# Patient Record
Sex: MALE | Race: WHITE | NOT HISPANIC OR LATINO | Employment: OTHER | URBAN - METROPOLITAN AREA
[De-identification: names, ages, dates, MRNs, and addresses within clinical notes are randomized per-mention and may not be internally consistent; named-entity substitution may affect disease eponyms.]

---

## 2020-07-19 ENCOUNTER — APPOINTMENT (OUTPATIENT)
Dept: RADIOLOGY | Facility: MEDICAL CENTER | Age: 45
DRG: 100 | End: 2020-07-19
Attending: EMERGENCY MEDICINE

## 2020-07-19 ENCOUNTER — APPOINTMENT (OUTPATIENT)
Dept: RADIOLOGY | Facility: MEDICAL CENTER | Age: 45
DRG: 100 | End: 2020-07-19
Attending: PSYCHIATRY & NEUROLOGY

## 2020-07-19 ENCOUNTER — HOSPITAL ENCOUNTER (INPATIENT)
Facility: MEDICAL CENTER | Age: 45
LOS: 3 days | DRG: 100 | End: 2020-07-22
Attending: EMERGENCY MEDICINE | Admitting: INTERNAL MEDICINE

## 2020-07-19 DIAGNOSIS — I16.1 HYPERTENSIVE EMERGENCY: ICD-10-CM

## 2020-07-19 DIAGNOSIS — E87.20 LACTIC ACIDOSIS: ICD-10-CM

## 2020-07-19 DIAGNOSIS — R56.9 SEIZURE-LIKE ACTIVITY (HCC): Primary | ICD-10-CM

## 2020-07-19 DIAGNOSIS — R73.9 HYPERGLYCEMIA: ICD-10-CM

## 2020-07-19 DIAGNOSIS — G40.901 STATUS EPILEPTICUS (HCC): ICD-10-CM

## 2020-07-19 PROBLEM — J96.01 ACUTE RESPIRATORY FAILURE WITH HYPOXIA (HCC): Status: ACTIVE | Noted: 2020-07-19

## 2020-07-19 PROBLEM — E66.09 CLASS 1 OBESITY DUE TO EXCESS CALORIES WITHOUT SERIOUS COMORBIDITY WITH BODY MASS INDEX (BMI) OF 31.0 TO 31.9 IN ADULT: Status: ACTIVE | Noted: 2020-07-19

## 2020-07-19 PROBLEM — F41.9 CHRONIC ANXIETY: Status: ACTIVE | Noted: 2020-07-19

## 2020-07-19 PROBLEM — N17.9 AKI (ACUTE KIDNEY INJURY) (HCC): Status: ACTIVE | Noted: 2020-07-19

## 2020-07-19 PROBLEM — E11.65 UNCONTROLLED TYPE 2 DIABETES MELLITUS WITH HYPERGLYCEMIA (HCC): Status: ACTIVE | Noted: 2020-07-19

## 2020-07-19 PROBLEM — R94.31 PROLONGED Q-T INTERVAL ON ECG: Status: ACTIVE | Noted: 2020-07-19

## 2020-07-19 PROBLEM — D72.829 LEUKOCYTOSIS: Status: ACTIVE | Noted: 2020-07-19

## 2020-07-19 LAB
ACTION RANGE TRIGGERED IACRT: YES
ALBUMIN SERPL BCP-MCNC: 2.9 G/DL (ref 3.2–4.9)
ALBUMIN SERPL BCP-MCNC: 3.7 G/DL (ref 3.2–4.9)
ALBUMIN/GLOB SERPL: 1.1 G/DL
ALBUMIN/GLOB SERPL: 1.1 G/DL
ALP SERPL-CCNC: 65 U/L (ref 30–99)
ALP SERPL-CCNC: 90 U/L (ref 30–99)
ALT SERPL-CCNC: 17 U/L (ref 2–50)
ALT SERPL-CCNC: 24 U/L (ref 2–50)
ANION GAP SERPL CALC-SCNC: 15 MMOL/L (ref 7–16)
ANION GAP SERPL CALC-SCNC: 17 MMOL/L (ref 7–16)
ANION GAP SERPL CALC-SCNC: 21 MMOL/L (ref 7–16)
ANION GAP SERPL CALC-SCNC: 22 MMOL/L (ref 7–16)
ANION GAP SERPL CALC-SCNC: 37 MMOL/L (ref 7–16)
ANISOCYTOSIS BLD QL SMEAR: ABNORMAL
APPEARANCE UR: ABNORMAL
APTT PPP: 22.6 SEC (ref 24.7–36)
AST SERPL-CCNC: 12 U/L (ref 12–45)
AST SERPL-CCNC: 14 U/L (ref 12–45)
B-OH-BUTYR SERPL-MCNC: 3.57 MMOL/L (ref 0.02–0.27)
BACTERIA #/AREA URNS HPF: NEGATIVE /HPF
BASE EXCESS BLDA CALC-SCNC: -9 MMOL/L (ref -4–3)
BASO STIPL BLD QL SMEAR: NORMAL
BASOPHILS # BLD AUTO: 0 % (ref 0–1.8)
BASOPHILS # BLD AUTO: 0.1 % (ref 0–1.8)
BASOPHILS # BLD: 0 K/UL (ref 0–0.12)
BASOPHILS # BLD: 0.02 K/UL (ref 0–0.12)
BILIRUB SERPL-MCNC: 0.7 MG/DL (ref 0.1–1.5)
BILIRUB SERPL-MCNC: 0.8 MG/DL (ref 0.1–1.5)
BILIRUB UR QL STRIP.AUTO: NEGATIVE
BODY TEMPERATURE: ABNORMAL DEGREES
BUN SERPL-MCNC: 16 MG/DL (ref 8–22)
BUN SERPL-MCNC: 18 MG/DL (ref 8–22)
BUN SERPL-MCNC: 19 MG/DL (ref 8–22)
BUN SERPL-MCNC: 20 MG/DL (ref 8–22)
BUN SERPL-MCNC: 21 MG/DL (ref 8–22)
CALCIUM SERPL-MCNC: 7.7 MG/DL (ref 8.5–10.5)
CALCIUM SERPL-MCNC: 7.8 MG/DL (ref 8.5–10.5)
CALCIUM SERPL-MCNC: 8.4 MG/DL (ref 8.5–10.5)
CALCIUM SERPL-MCNC: 8.4 MG/DL (ref 8.5–10.5)
CALCIUM SERPL-MCNC: 9.3 MG/DL (ref 8.5–10.5)
CHLORIDE SERPL-SCNC: 102 MMOL/L (ref 96–112)
CHLORIDE SERPL-SCNC: 104 MMOL/L (ref 96–112)
CHLORIDE SERPL-SCNC: 87 MMOL/L (ref 96–112)
CHLORIDE SERPL-SCNC: 92 MMOL/L (ref 96–112)
CHLORIDE SERPL-SCNC: 95 MMOL/L (ref 96–112)
CK SERPL-CCNC: 125 U/L (ref 0–154)
CO2 BLDA-SCNC: 20 MMOL/L (ref 20–33)
CO2 SERPL-SCNC: 16 MMOL/L (ref 20–33)
CO2 SERPL-SCNC: 16 MMOL/L (ref 20–33)
CO2 SERPL-SCNC: 19 MMOL/L (ref 20–33)
CO2 SERPL-SCNC: 19 MMOL/L (ref 20–33)
CO2 SERPL-SCNC: 9 MMOL/L (ref 20–33)
COLOR UR: YELLOW
COVID ORDER STATUS COVID19: NORMAL
CREAT SERPL-MCNC: 1.76 MG/DL (ref 0.5–1.4)
CREAT SERPL-MCNC: 1.77 MG/DL (ref 0.5–1.4)
CREAT SERPL-MCNC: 1.91 MG/DL (ref 0.5–1.4)
CREAT SERPL-MCNC: 2.1 MG/DL (ref 0.5–1.4)
CREAT SERPL-MCNC: 2.39 MG/DL (ref 0.5–1.4)
EKG IMPRESSION: NORMAL
EOSINOPHIL # BLD AUTO: 0 K/UL (ref 0–0.51)
EOSINOPHIL # BLD AUTO: 0.32 K/UL (ref 0–0.51)
EOSINOPHIL NFR BLD: 0 % (ref 0–6.9)
EOSINOPHIL NFR BLD: 1.7 % (ref 0–6.9)
EPI CELLS #/AREA URNS HPF: ABNORMAL /HPF
ERYTHROCYTE [DISTWIDTH] IN BLOOD BY AUTOMATED COUNT: 43.8 FL (ref 35.9–50)
ERYTHROCYTE [DISTWIDTH] IN BLOOD BY AUTOMATED COUNT: 44.7 FL (ref 35.9–50)
EST. AVERAGE GLUCOSE BLD GHB EST-MCNC: 372 MG/DL
GLOBULIN SER CALC-MCNC: 2.6 G/DL (ref 1.9–3.5)
GLOBULIN SER CALC-MCNC: 3.4 G/DL (ref 1.9–3.5)
GLUCOSE BLD-MCNC: 111 MG/DL (ref 65–99)
GLUCOSE BLD-MCNC: 132 MG/DL (ref 65–99)
GLUCOSE BLD-MCNC: 140 MG/DL (ref 65–99)
GLUCOSE BLD-MCNC: 169 MG/DL (ref 65–99)
GLUCOSE BLD-MCNC: 177 MG/DL (ref 65–99)
GLUCOSE BLD-MCNC: 181 MG/DL (ref 65–99)
GLUCOSE BLD-MCNC: 191 MG/DL (ref 65–99)
GLUCOSE BLD-MCNC: 204 MG/DL (ref 65–99)
GLUCOSE BLD-MCNC: 270 MG/DL (ref 65–99)
GLUCOSE BLD-MCNC: 393 MG/DL (ref 65–99)
GLUCOSE BLD-MCNC: 482 MG/DL (ref 65–99)
GLUCOSE BLD-MCNC: 483 MG/DL (ref 65–99)
GLUCOSE BLD-MCNC: 90 MG/DL (ref 65–99)
GLUCOSE BLD-MCNC: 93 MG/DL (ref 65–99)
GLUCOSE SERPL-MCNC: 115 MG/DL (ref 65–99)
GLUCOSE SERPL-MCNC: 136 MG/DL (ref 65–99)
GLUCOSE SERPL-MCNC: 484 MG/DL (ref 65–99)
GLUCOSE SERPL-MCNC: 538 MG/DL (ref 65–99)
GLUCOSE SERPL-MCNC: 558 MG/DL (ref 65–99)
GLUCOSE UR STRIP.AUTO-MCNC: >=1000 MG/DL
HBA1C MFR BLD: 14.6 % (ref 0–5.6)
HCO3 BLDA-SCNC: 19 MMOL/L (ref 17–25)
HCT VFR BLD AUTO: 37.5 % (ref 42–52)
HCT VFR BLD AUTO: 48.5 % (ref 42–52)
HGB BLD-MCNC: 12.3 G/DL (ref 14–18)
HGB BLD-MCNC: 15.5 G/DL (ref 14–18)
HOROWITZ INDEX BLDA+IHG-RTO: 397 MM[HG]
HYALINE CASTS #/AREA URNS LPF: ABNORMAL /LPF
IMM GRANULOCYTES # BLD AUTO: 0.11 K/UL (ref 0–0.11)
IMM GRANULOCYTES NFR BLD AUTO: 0.7 % (ref 0–0.9)
INST. QUALIFIED PATIENT IIQPT: YES
KETONES UR STRIP.AUTO-MCNC: 40 MG/DL
LACTATE BLD-SCNC: 16.6 MMOL/L (ref 0.5–2)
LEUKOCYTE ESTERASE UR QL STRIP.AUTO: NEGATIVE
LYMPHOCYTES # BLD AUTO: 1.55 K/UL (ref 1–4.8)
LYMPHOCYTES # BLD AUTO: 3.76 K/UL (ref 1–4.8)
LYMPHOCYTES NFR BLD: 10.4 % (ref 22–41)
LYMPHOCYTES NFR BLD: 20 % (ref 22–41)
MAGNESIUM SERPL-MCNC: 2.1 MG/DL (ref 1.5–2.5)
MAGNESIUM SERPL-MCNC: 2.2 MG/DL (ref 1.5–2.5)
MAGNESIUM SERPL-MCNC: 2.3 MG/DL (ref 1.5–2.5)
MANUAL DIFF BLD: NORMAL
MCH RBC QN AUTO: 29.7 PG (ref 27–33)
MCH RBC QN AUTO: 29.7 PG (ref 27–33)
MCHC RBC AUTO-ENTMCNC: 32 G/DL (ref 33.7–35.3)
MCHC RBC AUTO-ENTMCNC: 32.8 G/DL (ref 33.7–35.3)
MCV RBC AUTO: 90.6 FL (ref 81.4–97.8)
MCV RBC AUTO: 92.9 FL (ref 81.4–97.8)
METAMYELOCYTES NFR BLD MANUAL: 0.9 %
MICRO URNS: ABNORMAL
MICROCYTES BLD QL SMEAR: ABNORMAL
MONOCYTES # BLD AUTO: 0.98 K/UL (ref 0–0.85)
MONOCYTES # BLD AUTO: 1.03 K/UL (ref 0–0.85)
MONOCYTES NFR BLD AUTO: 5.2 % (ref 0–13.4)
MONOCYTES NFR BLD AUTO: 6.9 % (ref 0–13.4)
MORPHOLOGY BLD-IMP: NORMAL
NEUTROPHILS # BLD AUTO: 12.25 K/UL (ref 1.82–7.42)
NEUTROPHILS # BLD AUTO: 13.57 K/UL (ref 1.82–7.42)
NEUTROPHILS NFR BLD: 72.2 % (ref 44–72)
NEUTROPHILS NFR BLD: 81.9 % (ref 44–72)
NITRITE UR QL STRIP.AUTO: NEGATIVE
NRBC # BLD AUTO: 0 K/UL
NRBC # BLD AUTO: 0 K/UL
NRBC BLD-RTO: 0 /100 WBC
NRBC BLD-RTO: 0 /100 WBC
O2/TOTAL GAS SETTING VFR VENT: 100 %
OVALOCYTES BLD QL SMEAR: NORMAL
PCO2 BLDA: 47.5 MMHG (ref 26–37)
PCO2 TEMP ADJ BLDA: 48.5 MMHG (ref 26–37)
PH BLDA: 7.21 [PH] (ref 7.4–7.5)
PH TEMP ADJ BLDA: 7.2 [PH] (ref 7.4–7.5)
PH UR STRIP.AUTO: 5 [PH] (ref 5–8)
PHOSPHATE SERPL-MCNC: 1.8 MG/DL (ref 2.5–4.5)
PHOSPHATE SERPL-MCNC: 2.6 MG/DL (ref 2.5–4.5)
PLATELET # BLD AUTO: 211 K/UL (ref 164–446)
PLATELET # BLD AUTO: 352 K/UL (ref 164–446)
PLATELET BLD QL SMEAR: NORMAL
PMV BLD AUTO: 10.2 FL (ref 9–12.9)
PMV BLD AUTO: 9.8 FL (ref 9–12.9)
PO2 BLDA: 397 MMHG (ref 64–87)
PO2 TEMP ADJ BLDA: 400 MMHG (ref 64–87)
POIKILOCYTOSIS BLD QL SMEAR: NORMAL
POLYCHROMASIA BLD QL SMEAR: NORMAL
POTASSIUM SERPL-SCNC: 3.3 MMOL/L (ref 3.6–5.5)
POTASSIUM SERPL-SCNC: 3.7 MMOL/L (ref 3.6–5.5)
POTASSIUM SERPL-SCNC: 3.8 MMOL/L (ref 3.6–5.5)
POTASSIUM SERPL-SCNC: 4.3 MMOL/L (ref 3.6–5.5)
POTASSIUM SERPL-SCNC: 4.4 MMOL/L (ref 3.6–5.5)
PROT SERPL-MCNC: 5.5 G/DL (ref 6–8.2)
PROT SERPL-MCNC: 7.1 G/DL (ref 6–8.2)
PROT UR QL STRIP: 300 MG/DL
RBC # BLD AUTO: 4.14 M/UL (ref 4.7–6.1)
RBC # BLD AUTO: 5.22 M/UL (ref 4.7–6.1)
RBC # URNS HPF: ABNORMAL /HPF
RBC BLD AUTO: PRESENT
RBC UR QL AUTO: ABNORMAL
RENAL EPI CELLS #/AREA URNS HPF: ABNORMAL /HPF
SAO2 % BLDA: 100 % (ref 93–99)
SODIUM SERPL-SCNC: 130 MMOL/L (ref 135–145)
SODIUM SERPL-SCNC: 132 MMOL/L (ref 135–145)
SODIUM SERPL-SCNC: 133 MMOL/L (ref 135–145)
SODIUM SERPL-SCNC: 138 MMOL/L (ref 135–145)
SODIUM SERPL-SCNC: 138 MMOL/L (ref 135–145)
SP GR UR STRIP.AUTO: 1.03
SPECIMEN DRAWN FROM PATIENT: ABNORMAL
SPERM #/AREA URNS HPF: ABNORMAL /HPF
TRIGL SERPL-MCNC: 219 MG/DL (ref 0–149)
TRIGL SERPL-MCNC: 233 MG/DL (ref 0–149)
TSH SERPL DL<=0.005 MIU/L-ACNC: 0.95 UIU/ML (ref 0.38–5.33)
UROBILINOGEN UR STRIP.AUTO-MCNC: 1 MG/DL
WBC # BLD AUTO: 15 K/UL (ref 4.8–10.8)
WBC # BLD AUTO: 18.8 K/UL (ref 4.8–10.8)
WBC #/AREA URNS HPF: ABNORMAL /HPF

## 2020-07-19 PROCEDURE — U0003 INFECTIOUS AGENT DETECTION BY NUCLEIC ACID (DNA OR RNA); SEVERE ACUTE RESPIRATORY SYNDROME CORONAVIRUS 2 (SARS-COV-2) (CORONAVIRUS DISEASE [COVID-19]), AMPLIFIED PROBE TECHNIQUE, MAKING USE OF HIGH THROUGHPUT TECHNOLOGIES AS DESCRIBED BY CMS-2020-01-R: HCPCS

## 2020-07-19 PROCEDURE — 700105 HCHG RX REV CODE 258: Performed by: INTERNAL MEDICINE

## 2020-07-19 PROCEDURE — 302214 INTUBATION BOX: Performed by: EMERGENCY MEDICINE

## 2020-07-19 PROCEDURE — 80053 COMPREHEN METABOLIC PANEL: CPT

## 2020-07-19 PROCEDURE — 700105 HCHG RX REV CODE 258: Performed by: EMERGENCY MEDICINE

## 2020-07-19 PROCEDURE — 84100 ASSAY OF PHOSPHORUS: CPT

## 2020-07-19 PROCEDURE — 700105 HCHG RX REV CODE 258

## 2020-07-19 PROCEDURE — 700102 HCHG RX REV CODE 250 W/ 637 OVERRIDE(OP): Performed by: INTERNAL MEDICINE

## 2020-07-19 PROCEDURE — 700111 HCHG RX REV CODE 636 W/ 250 OVERRIDE (IP): Performed by: PSYCHIATRY & NEUROLOGY

## 2020-07-19 PROCEDURE — 81001 URINALYSIS AUTO W/SCOPE: CPT

## 2020-07-19 PROCEDURE — 700117 HCHG RX CONTRAST REV CODE 255: Performed by: PSYCHIATRY & NEUROLOGY

## 2020-07-19 PROCEDURE — 84478 ASSAY OF TRIGLYCERIDES: CPT

## 2020-07-19 PROCEDURE — 83735 ASSAY OF MAGNESIUM: CPT

## 2020-07-19 PROCEDURE — 99255 IP/OBS CONSLTJ NEW/EST HI 80: CPT | Performed by: PSYCHIATRY & NEUROLOGY

## 2020-07-19 PROCEDURE — 5A1935Z RESPIRATORY VENTILATION, LESS THAN 24 CONSECUTIVE HOURS: ICD-10-PCS | Performed by: INTERNAL MEDICINE

## 2020-07-19 PROCEDURE — 85027 COMPLETE CBC AUTOMATED: CPT

## 2020-07-19 PROCEDURE — 80048 BASIC METABOLIC PNL TOTAL CA: CPT | Mod: 91

## 2020-07-19 PROCEDURE — A9576 INJ PROHANCE MULTIPACK: HCPCS | Performed by: PSYCHIATRY & NEUROLOGY

## 2020-07-19 PROCEDURE — 85730 THROMBOPLASTIN TIME PARTIAL: CPT

## 2020-07-19 PROCEDURE — 87086 URINE CULTURE/COLONY COUNT: CPT

## 2020-07-19 PROCEDURE — 700101 HCHG RX REV CODE 250: Performed by: INTERNAL MEDICINE

## 2020-07-19 PROCEDURE — 700111 HCHG RX REV CODE 636 W/ 250 OVERRIDE (IP)

## 2020-07-19 PROCEDURE — 71045 X-RAY EXAM CHEST 1 VIEW: CPT

## 2020-07-19 PROCEDURE — 99291 CRITICAL CARE FIRST HOUR: CPT

## 2020-07-19 PROCEDURE — 70553 MRI BRAIN STEM W/O & W/DYE: CPT

## 2020-07-19 PROCEDURE — 93005 ELECTROCARDIOGRAM TRACING: CPT | Performed by: EMERGENCY MEDICINE

## 2020-07-19 PROCEDURE — 0BH17EZ INSERTION OF ENDOTRACHEAL AIRWAY INTO TRACHEA, VIA NATURAL OR ARTIFICIAL OPENING: ICD-10-PCS | Performed by: EMERGENCY MEDICINE

## 2020-07-19 PROCEDURE — 96366 THER/PROPH/DIAG IV INF ADDON: CPT

## 2020-07-19 PROCEDURE — 99292 CRITICAL CARE ADDL 30 MIN: CPT | Performed by: INTERNAL MEDICINE

## 2020-07-19 PROCEDURE — C9803 HOPD COVID-19 SPEC COLLECT: HCPCS | Performed by: INTERNAL MEDICINE

## 2020-07-19 PROCEDURE — 700101 HCHG RX REV CODE 250: Performed by: EMERGENCY MEDICINE

## 2020-07-19 PROCEDURE — 770022 HCHG ROOM/CARE - ICU (200)

## 2020-07-19 PROCEDURE — 85007 BL SMEAR W/DIFF WBC COUNT: CPT

## 2020-07-19 PROCEDURE — 94770 HCHG CO2 EXPIRED GAS DETERMINATION: CPT

## 2020-07-19 PROCEDURE — 85025 COMPLETE CBC W/AUTO DIFF WBC: CPT

## 2020-07-19 PROCEDURE — 99291 CRITICAL CARE FIRST HOUR: CPT | Performed by: INTERNAL MEDICINE

## 2020-07-19 PROCEDURE — 70450 CT HEAD/BRAIN W/O DYE: CPT

## 2020-07-19 PROCEDURE — 82962 GLUCOSE BLOOD TEST: CPT | Mod: 91

## 2020-07-19 PROCEDURE — 82010 KETONE BODYS QUAN: CPT

## 2020-07-19 PROCEDURE — 94002 VENT MGMT INPAT INIT DAY: CPT

## 2020-07-19 PROCEDURE — 36600 WITHDRAWAL OF ARTERIAL BLOOD: CPT

## 2020-07-19 PROCEDURE — 700111 HCHG RX REV CODE 636 W/ 250 OVERRIDE (IP): Performed by: INTERNAL MEDICINE

## 2020-07-19 PROCEDURE — 82550 ASSAY OF CK (CPK): CPT

## 2020-07-19 PROCEDURE — 83036 HEMOGLOBIN GLYCOSYLATED A1C: CPT

## 2020-07-19 PROCEDURE — 36415 COLL VENOUS BLD VENIPUNCTURE: CPT

## 2020-07-19 PROCEDURE — 96375 TX/PRO/DX INJ NEW DRUG ADDON: CPT

## 2020-07-19 PROCEDURE — 83605 ASSAY OF LACTIC ACID: CPT

## 2020-07-19 PROCEDURE — 96365 THER/PROPH/DIAG IV INF INIT: CPT

## 2020-07-19 PROCEDURE — 82803 BLOOD GASES ANY COMBINATION: CPT

## 2020-07-19 PROCEDURE — 31500 INSERT EMERGENCY AIRWAY: CPT

## 2020-07-19 PROCEDURE — 700111 HCHG RX REV CODE 636 W/ 250 OVERRIDE (IP): Performed by: EMERGENCY MEDICINE

## 2020-07-19 PROCEDURE — 84443 ASSAY THYROID STIM HORMONE: CPT

## 2020-07-19 RX ORDER — BISACODYL 10 MG
10 SUPPOSITORY, RECTAL RECTAL
Status: DISCONTINUED | OUTPATIENT
Start: 2020-07-19 | End: 2020-07-22 | Stop reason: HOSPADM

## 2020-07-19 RX ORDER — SODIUM CHLORIDE 9 MG/ML
INJECTION, SOLUTION INTRAVENOUS
Status: COMPLETED
Start: 2020-07-19 | End: 2020-07-19

## 2020-07-19 RX ORDER — POTASSIUM CHLORIDE 7.45 MG/ML
10 INJECTION INTRAVENOUS
Status: COMPLETED | OUTPATIENT
Start: 2020-07-19 | End: 2020-07-19

## 2020-07-19 RX ORDER — ETOMIDATE 2 MG/ML
30 INJECTION INTRAVENOUS ONCE
Status: COMPLETED | OUTPATIENT
Start: 2020-07-19 | End: 2020-07-19

## 2020-07-19 RX ORDER — SODIUM CHLORIDE 9 MG/ML
1000 INJECTION, SOLUTION INTRAVENOUS ONCE
Status: COMPLETED | OUTPATIENT
Start: 2020-07-19 | End: 2020-07-19

## 2020-07-19 RX ORDER — LABETALOL HYDROCHLORIDE 5 MG/ML
10 INJECTION, SOLUTION INTRAVENOUS EVERY 6 HOURS PRN
Status: DISCONTINUED | OUTPATIENT
Start: 2020-07-19 | End: 2020-07-20

## 2020-07-19 RX ORDER — SODIUM CHLORIDE 9 MG/ML
2000 INJECTION, SOLUTION INTRAVENOUS ONCE
Status: DISCONTINUED | OUTPATIENT
Start: 2020-07-19 | End: 2020-07-19

## 2020-07-19 RX ORDER — DEXTROSE MONOHYDRATE 25 G/50ML
25-50 INJECTION, SOLUTION INTRAVENOUS PRN
Status: DISCONTINUED | OUTPATIENT
Start: 2020-07-19 | End: 2020-07-20

## 2020-07-19 RX ORDER — POLYETHYLENE GLYCOL 3350 17 G/17G
1 POWDER, FOR SOLUTION ORAL
Status: DISCONTINUED | OUTPATIENT
Start: 2020-07-19 | End: 2020-07-22 | Stop reason: HOSPADM

## 2020-07-19 RX ORDER — LORAZEPAM 2 MG/ML
INJECTION INTRAMUSCULAR
Status: COMPLETED
Start: 2020-07-19 | End: 2020-07-19

## 2020-07-19 RX ORDER — IPRATROPIUM BROMIDE AND ALBUTEROL SULFATE 2.5; .5 MG/3ML; MG/3ML
3 SOLUTION RESPIRATORY (INHALATION)
Status: DISCONTINUED | OUTPATIENT
Start: 2020-07-19 | End: 2020-07-22 | Stop reason: HOSPADM

## 2020-07-19 RX ORDER — AMOXICILLIN 250 MG
2 CAPSULE ORAL 2 TIMES DAILY
Status: DISCONTINUED | OUTPATIENT
Start: 2020-07-19 | End: 2020-07-22 | Stop reason: HOSPADM

## 2020-07-19 RX ORDER — ROCURONIUM BROMIDE 10 MG/ML
100 INJECTION, SOLUTION INTRAVENOUS ONCE
Status: COMPLETED | OUTPATIENT
Start: 2020-07-19 | End: 2020-07-19

## 2020-07-19 RX ORDER — SODIUM CHLORIDE, SODIUM LACTATE, POTASSIUM CHLORIDE, CALCIUM CHLORIDE 600; 310; 30; 20 MG/100ML; MG/100ML; MG/100ML; MG/100ML
INJECTION, SOLUTION INTRAVENOUS CONTINUOUS
Status: DISCONTINUED | OUTPATIENT
Start: 2020-07-19 | End: 2020-07-19

## 2020-07-19 RX ORDER — SODIUM CHLORIDE, SODIUM LACTATE, POTASSIUM CHLORIDE, AND CALCIUM CHLORIDE .6; .31; .03; .02 G/100ML; G/100ML; G/100ML; G/100ML
2000 INJECTION, SOLUTION INTRAVENOUS ONCE
Status: DISCONTINUED | OUTPATIENT
Start: 2020-07-19 | End: 2020-07-19

## 2020-07-19 RX ORDER — LORAZEPAM 2 MG/ML
2 INJECTION INTRAMUSCULAR ONCE
Status: COMPLETED | OUTPATIENT
Start: 2020-07-19 | End: 2020-07-19

## 2020-07-19 RX ORDER — FAMOTIDINE 20 MG/1
20 TABLET, FILM COATED ORAL EVERY 12 HOURS
Status: DISCONTINUED | OUTPATIENT
Start: 2020-07-19 | End: 2020-07-20

## 2020-07-19 RX ORDER — MAGNESIUM SULFATE HEPTAHYDRATE 40 MG/ML
4 INJECTION, SOLUTION INTRAVENOUS
Status: DISCONTINUED | OUTPATIENT
Start: 2020-07-19 | End: 2020-07-19

## 2020-07-19 RX ORDER — DEXTROSE AND SODIUM CHLORIDE 10; .45 G/100ML; G/100ML
INJECTION, SOLUTION INTRAVENOUS CONTINUOUS
Status: DISCONTINUED | OUTPATIENT
Start: 2020-07-19 | End: 2020-07-19

## 2020-07-19 RX ORDER — ACETAMINOPHEN 325 MG/1
650 TABLET ORAL EVERY 6 HOURS PRN
Status: DISCONTINUED | OUTPATIENT
Start: 2020-07-19 | End: 2020-07-22 | Stop reason: HOSPADM

## 2020-07-19 RX ORDER — DEXTROSE MONOHYDRATE 25 G/50ML
50 INJECTION, SOLUTION INTRAVENOUS
Status: DISCONTINUED | OUTPATIENT
Start: 2020-07-19 | End: 2020-07-19

## 2020-07-19 RX ORDER — DEXTROSE AND SODIUM CHLORIDE 5; .45 G/100ML; G/100ML
INJECTION, SOLUTION INTRAVENOUS CONTINUOUS
Status: DISCONTINUED | OUTPATIENT
Start: 2020-07-19 | End: 2020-07-19

## 2020-07-19 RX ORDER — LORAZEPAM 2 MG/ML
1-2 INJECTION INTRAMUSCULAR
Status: DISCONTINUED | OUTPATIENT
Start: 2020-07-19 | End: 2020-07-22 | Stop reason: HOSPADM

## 2020-07-19 RX ORDER — SODIUM CHLORIDE 9 MG/ML
500 INJECTION, SOLUTION INTRAVENOUS ONCE
Status: COMPLETED | OUTPATIENT
Start: 2020-07-19 | End: 2020-07-19

## 2020-07-19 RX ORDER — MAGNESIUM SULFATE HEPTAHYDRATE 40 MG/ML
2 INJECTION, SOLUTION INTRAVENOUS
Status: DISCONTINUED | OUTPATIENT
Start: 2020-07-19 | End: 2020-07-19

## 2020-07-19 RX ORDER — POTASSIUM CHLORIDE 7.45 MG/ML
10 INJECTION INTRAVENOUS
Status: DISPENSED | OUTPATIENT
Start: 2020-07-19 | End: 2020-07-19

## 2020-07-19 RX ORDER — LEVETIRACETAM 10 MG/ML
1000 INJECTION INTRAVASCULAR EVERY 12 HOURS
Status: DISCONTINUED | OUTPATIENT
Start: 2020-07-19 | End: 2020-07-21

## 2020-07-19 RX ADMIN — PROPOFOL 30 MCG/KG/MIN: 10 INJECTION, EMULSION INTRAVENOUS at 18:25

## 2020-07-19 RX ADMIN — POTASSIUM CHLORIDE 10 MEQ: 7.46 INJECTION, SOLUTION INTRAVENOUS at 10:04

## 2020-07-19 RX ADMIN — PROPOFOL 40 MCG/KG/MIN: 10 INJECTION, EMULSION INTRAVENOUS at 06:35

## 2020-07-19 RX ADMIN — DEXTROSE AND SODIUM CHLORIDE: 5; 450 INJECTION, SOLUTION INTRAVENOUS at 12:14

## 2020-07-19 RX ADMIN — FENTANYL CITRATE 100 MCG: 50 INJECTION INTRAMUSCULAR; INTRAVENOUS at 18:43

## 2020-07-19 RX ADMIN — SODIUM CHLORIDE 500 ML: 9 INJECTION, SOLUTION INTRAVENOUS at 10:03

## 2020-07-19 RX ADMIN — POTASSIUM CHLORIDE 10 MEQ: 7.46 INJECTION, SOLUTION INTRAVENOUS at 11:00

## 2020-07-19 RX ADMIN — DEXTROSE AND SODIUM CHLORIDE: 10; .45 INJECTION, SOLUTION INTRAVENOUS at 13:33

## 2020-07-19 RX ADMIN — SODIUM CHLORIDE, POTASSIUM CHLORIDE, SODIUM LACTATE AND CALCIUM CHLORIDE: 600; 310; 30; 20 INJECTION, SOLUTION INTRAVENOUS at 07:54

## 2020-07-19 RX ADMIN — FENTANYL CITRATE 100 MCG: 50 INJECTION INTRAMUSCULAR; INTRAVENOUS at 21:56

## 2020-07-19 RX ADMIN — SODIUM CHLORIDE 10 UNITS/HR: 9 INJECTION, SOLUTION INTRAVENOUS at 09:14

## 2020-07-19 RX ADMIN — POTASSIUM CHLORIDE 10 MEQ: 7.46 INJECTION, SOLUTION INTRAVENOUS at 14:42

## 2020-07-19 RX ADMIN — POTASSIUM PHOSPHATE, MONOBASIC AND POTASSIUM PHOSPHATE, DIBASIC 30 MMOL: 224; 236 INJECTION, SOLUTION, CONCENTRATE INTRAVENOUS at 19:40

## 2020-07-19 RX ADMIN — INSULIN HUMAN 9 UNITS: 100 INJECTION, SOLUTION PARENTERAL at 07:53

## 2020-07-19 RX ADMIN — ENOXAPARIN SODIUM 40 MG: 40 INJECTION SUBCUTANEOUS at 07:46

## 2020-07-19 RX ADMIN — SODIUM CHLORIDE 1000 ML: 9 INJECTION, SOLUTION INTRAVENOUS at 04:30

## 2020-07-19 RX ADMIN — FAMOTIDINE 20 MG: 10 INJECTION, SOLUTION INTRAVENOUS at 17:24

## 2020-07-19 RX ADMIN — FENTANYL CITRATE 100 MCG: 50 INJECTION INTRAMUSCULAR; INTRAVENOUS at 23:43

## 2020-07-19 RX ADMIN — FENTANYL CITRATE 100 MCG: 50 INJECTION INTRAMUSCULAR; INTRAVENOUS at 16:47

## 2020-07-19 RX ADMIN — PROPOFOL 30 MCG/KG/MIN: 10 INJECTION, EMULSION INTRAVENOUS at 22:56

## 2020-07-19 RX ADMIN — PROPOFOL 30 MCG/KG/MIN: 10 INJECTION, EMULSION INTRAVENOUS at 04:40

## 2020-07-19 RX ADMIN — LORAZEPAM 2 MG: 2 INJECTION INTRAMUSCULAR; INTRAVENOUS at 04:30

## 2020-07-19 RX ADMIN — LORAZEPAM 2 MG: 2 INJECTION INTRAMUSCULAR at 04:30

## 2020-07-19 RX ADMIN — ROCURONIUM BROMIDE 100 MG: 10 INJECTION, SOLUTION INTRAVENOUS at 04:24

## 2020-07-19 RX ADMIN — SODIUM CHLORIDE, POTASSIUM CHLORIDE, SODIUM LACTATE AND CALCIUM CHLORIDE: 600; 310; 30; 20 INJECTION, SOLUTION INTRAVENOUS at 10:39

## 2020-07-19 RX ADMIN — SODIUM CHLORIDE 1.5 UNITS/HR: 9 INJECTION, SOLUTION INTRAVENOUS at 19:45

## 2020-07-19 RX ADMIN — SODIUM CHLORIDE 500 ML: 9 INJECTION, SOLUTION INTRAVENOUS at 22:03

## 2020-07-19 RX ADMIN — FAMOTIDINE 20 MG: 10 INJECTION, SOLUTION INTRAVENOUS at 07:46

## 2020-07-19 RX ADMIN — POTASSIUM CHLORIDE 10 MEQ: 7.46 INJECTION, SOLUTION INTRAVENOUS at 16:24

## 2020-07-19 RX ADMIN — LEVETIRACETAM 1000 MG: 10 INJECTION INTRAVENOUS at 17:24

## 2020-07-19 RX ADMIN — ETOMIDATE 30 MG: 2 INJECTION INTRAVENOUS at 04:24

## 2020-07-19 RX ADMIN — SODIUM CHLORIDE 4500 MG: 9 INJECTION, SOLUTION INTRAVENOUS at 05:00

## 2020-07-19 RX ADMIN — PROPOFOL 30 MCG/KG/MIN: 10 INJECTION, EMULSION INTRAVENOUS at 13:35

## 2020-07-19 RX ADMIN — GADOTERIDOL 20 ML: 279.3 INJECTION, SOLUTION INTRAVENOUS at 13:19

## 2020-07-19 RX ADMIN — POTASSIUM CHLORIDE 10 MEQ: 7.46 INJECTION, SOLUTION INTRAVENOUS at 18:30

## 2020-07-19 RX ADMIN — SODIUM CHLORIDE 1000 ML: 9 INJECTION, SOLUTION INTRAVENOUS at 06:45

## 2020-07-19 ASSESSMENT — ENCOUNTER SYMPTOMS: SEIZURES: 1

## 2020-07-19 NOTE — PROGRESS NOTES
Brief followup:    MRI Brain w/o signs of PRES, only white matter disease/otherwise unremarkable. Seizure likely provoked by underlying metabolic abnormalities. Would continue Keppra for now until seen in clinic by Neurology (referral placed). MRI to be obtained in the morning. If c/w epileptiform discharges then will make further recommendations, but if c/w encephalopathy then neurology signs off.

## 2020-07-19 NOTE — ASSESSMENT & PLAN NOTE
Strongly suspect reactive only  Monitor-follow-up CBC and monitor fever curve  At risk for aspiration pneumonia with chest x-ray clear

## 2020-07-19 NOTE — DISCHARGE PLANNING
Medical Social Work     RICHY received a call from the front lobby advising RICHY that the pt wife is here at [facility]. RICHY met with Davina who is the pt wife and escorted her to the family room. RICHY advised Davina that we would update her and take he back to the pt room once they had the pt situated.     Davina (S/O) [phone]    Najma (mother) [phone]    RICHY escorted Davina back to the pt room both the critical care MD and ERP updated Davina on the pt medical condition.     Plan: RICHY will remain available for pt support.

## 2020-07-19 NOTE — PROGRESS NOTES
Brief neurology note    44m recently diagnosed w htn and witnessed by wive to have event at home. Had second sz w ems en route. Was not protecting airway and was intubated. Loaded w keppra.     Prelim recs:  Continue keppra  Mri brain  Eeg    Full neurology consult to follow    SURESH Vasquez MD  Neurology

## 2020-07-19 NOTE — ASSESSMENT & PLAN NOTE
Newly diagnosed. HA1C 14, hyperglycemia/DKA at time of admission  7/20 Transitioned to lantus 15units daily with ISS    Plan:   Continue optimize lantus to target goal BG. Increase lantus to 25 units daily with ISS  Goal -140  Diabetes education

## 2020-07-19 NOTE — CONSULTS
Hospital Neurology Consult:    Referring Physician: Roberth Lorenzo D.O.    Reason for consultation: Seizure    HPI: Dung José is a 44 y.o. male with history of HTN, DM, anxiety presenting to the hospital for seizure/agitation and consulted for seizure. Hx obtained from chart as patient is intubated. Patient reportedly does not see a physician regularly. Patient was told he had HTN however had not been getting treated for it yet. Patient's wife found him in room sonorous w/ blood tinged secretions coming from the mouth and he was not arousable. He then had a GTC and was given Versed, later required intubation. On chart review it appears he has an active UTI, significant hyperglycemia, and leukocytosis. Unable to obtain ROS.     ROS:     Unable to obtain    Past Medical History:   As above    FHx:  Unable to obtain    SHx:   Unable to obtain.     Allergies:  No Known Allergies    Medications:    Current Facility-Administered Medications:   •  levETIRAcetam (Keppra) 1000 mg in 100 mL NaCl IV premix, 1,000 mg, Intravenous, Q12HRS, Clay Vasquez M.D.  •  LORazepam (ATIVAN) injection 1-2 mg, 1-2 mg, Intravenous, Q HOUR PRN, Greyson Gregory M.D.  •  senna-docusate (PERICOLACE or SENOKOT S) 8.6-50 MG per tablet 2 Tab, 2 Tab, Oral, BID, Stopped at 07/19/20 0630 **AND** polyethylene glycol/lytes (MIRALAX) PACKET 1 Packet, 1 Packet, Oral, QDAY PRN **AND** magnesium hydroxide (MILK OF MAGNESIA) suspension 30 mL, 30 mL, Oral, QDAY PRN **AND** bisacodyl (DULCOLAX) suppository 10 mg, 10 mg, Rectal, QDAY PRN, Greyson Gregory M.D.  •  Respiratory Therapy Consult, , Nebulization, Continuous RT, Greyson Gregory M.D.  •  ipratropium-albuterol (DUONEB) nebulizer solution, 3 mL, Nebulization, Q2HRS PRN (RT), Greyson Gregory M.D.  •  famotidine (PEPCID) tablet 20 mg, 20 mg, Enteral Tube, Q12HRS **OR** famotidine (PEPCID) injection 20 mg, 20 mg, Intravenous, Q12HRS, Greyson Gregory M.D., 20 mg at 07/19/20 0746  •  MD  Alert...ICU Electrolyte Replacement per Pharmacy, , Other, PHARMACY TO DOSE, Greyson Gregory M.D.  •  lidocaine (XYLOCAINE) 1 % injection 1-2 mL, 1-2 mL, Tracheal Tube, Q30 MIN PRN, Greyson Gregory M.D.  •  enoxaparin (LOVENOX) inj 40 mg, 40 mg, Subcutaneous, DAILY, Greyson Gregory M.D., 40 mg at 07/19/20 0746  •  acetaminophen (TYLENOL) tablet 650 mg, 650 mg, Oral, Q6HRS PRN, Greyson Gregory M.D.  •  labetalol (NORMODYNE/TRANDATE) injection 10 mg, 10 mg, Intravenous, Q6HRS PRN, Greyson Gregory M.D.  •  fentaNYL (SUBLIMAZE) injection 25 mcg, 25 mcg, Intravenous, Q HOUR PRN **OR** fentaNYL (SUBLIMAZE) injection 50 mcg, 50 mcg, Intravenous, Q HOUR PRN **OR** fentaNYL (SUBLIMAZE) injection 100 mcg, 100 mcg, Intravenous, Q HOUR PRN, Greyson Gregory M.D.  •  propofol (DIPRIVAN) injection, 0-80 mcg/kg/min, Intravenous, Continuous, Last Rate: 12.5 mL/hr at 07/19/20 0710, 20 mcg/kg/min at 07/19/20 0710 **AND** Triglycerides Starting now and then Every 3 Days, , , Every 3 Days (0300), Greyson Gregory M.D.  •  lactated ringers infusion (BOLUS), 2,000 mL, Intravenous, Once, Last Rate: 1,000 mL/hr at 07/19/20 0831 **OR** [DISCONTINUED] NS (BOLUS) infusion 2,000 mL, 2,000 mL, Intravenous, Once, Roberth Lorenzo D.O.  •  D10%-0.45% NaCl infusion, , Intravenous, Continuous, Roberth Lorenzo D.O.  •  MD ALERT-PHARMACY TO CONSULT FOR DKA MONITORING 1 Each, 1 Each, Other, PRN, Roberth Lorenzo D.O.  •  magnesium sulfate IVPB premix 2 g, 2 g, Intravenous, Once PRN **OR** magnesium sulfate IVPB premix 4 g, 4 g, Intravenous, Once PRN, Roberth Lorenzo D.O.  •  potassium phosphate 30 mmol in  mL ivpb, 30 mmol, Intravenous, Once PRN **OR** sodium phosphate 30 mmol in 1/2  mL ivpb, 30 mmol, Intravenous, Once PRN, Roberth Lorenzo D.O.  •  Adult DKA potassium(K+) replacement scale, 1 Each, Intravenous, Q4HRS, Roberth Lorenzo D.O.  •  lactated ringers infusion, , Intravenous, Continuous, TRACIE Degroot.GABBIE.  •  D5 1/2 NS infusion, ,  Intravenous, Continuous, Roberth Lorenzo D.O.  •  insulin regular human (HUMULIN/NOVOLIN R) 62.5 Units in  mL Infusion for DKA, 10 Units/hr, Intravenous, Continuous, Roberth Lorenzo D.O.    Vitals:   Vitals:    07/19/20 0800 07/19/20 0815 07/19/20 0830 07/19/20 0845   BP: (!) 92/55 (!) 86/56 (!) 91/58    Pulse: 85 88 84 85   Resp: (!) 24 (!) 24 (!) 24 (!) 24   Temp:       TempSrc:       SpO2: 99% 98% 99% 98%   Weight:       Height:           Labs:  No results found for: PROTHROMBTM, INR   Lab Results   Component Value Date/Time    WBC 18.8 (H) 07/19/2020 04:03 AM    RBC 5.22 07/19/2020 04:03 AM    HEMOGLOBIN 15.5 07/19/2020 04:03 AM    HEMATOCRIT 48.5 07/19/2020 04:03 AM    MCV 92.9 07/19/2020 04:03 AM    MCH 29.7 07/19/2020 04:03 AM    MCHC 32.0 (L) 07/19/2020 04:03 AM    MPV 9.8 07/19/2020 04:03 AM    NEUTSPOLYS 72.20 (H) 07/19/2020 04:03 AM    LYMPHOCYTES 20.00 (L) 07/19/2020 04:03 AM    MONOCYTES 5.20 07/19/2020 04:03 AM    EOSINOPHILS 1.70 07/19/2020 04:03 AM    BASOPHILS 0.00 07/19/2020 04:03 AM    ANISOCYTOSIS 2+ 07/19/2020 04:03 AM      Lab Results   Component Value Date/Time    SODIUM 130 (L) 07/19/2020 06:15 AM    POTASSIUM 4.4 07/19/2020 06:15 AM    CHLORIDE 92 (L) 07/19/2020 06:15 AM    CO2 16 (L) 07/19/2020 06:15 AM    GLUCOSE 558 (HH) 07/19/2020 06:15 AM    BUN 18 07/19/2020 06:15 AM    CREATININE 1.77 (H) 07/19/2020 06:15 AM      Lab Results   Component Value Date/Time    TRIGLYCERIDE 219 (H) 07/19/2020 06:15 AM       Lab Results   Component Value Date/Time    ALKPHOSPHAT 90 07/19/2020 04:03 AM    ASTSGOT 14 07/19/2020 04:03 AM    ALTSGPT 24 07/19/2020 04:03 AM    TBILIRUBIN 0.8 07/19/2020 04:03 AM      Imaging/Testing:  CT head W/O CST personally reviewed w/o evidence of hemorrhage/acute ischemia.     Chest X ray on 7/19/20 reviewed in chart.     Physical Exam:     General: 45 y/o male intubated in NAD  Cardio: Normal S1/S2. +1 edema bilateral LEs.  Pulm: CTAX2. No respiratory distress.   Skin: Warm,  dry, no rashes or lesions   Psychiatric: Appropriate affect. No active psychosis.  HEENT: Atraumatic head, normal sclera and conjunctiva, moist oral mucosa. No lid lag.  Abdomen: distended, non tender. No masses or hepatosplenomegaly.    Neurologic:  Mental Status:  GCS3T (E1, M1, V1T)  Cranial Nerves:  PERRL. Occulocephalic reflex absent.   Motor: Normal muscle bulk decreased tone. Does not withdraw to nox stim.  Reflexes: Deferred  Coordination: Unable to assess  Sensation: No withdrawal to nox stim  Gait/Station: Unable to assess    Assessment/Plan:    Dung José is a 44 y.o. male with history of HTN, DM, anxiety presenting to the hospital for seizure/agitation and consulted for seizure. Patient appeared to have had a seizure per EMS. Description of the event is unavailable. Regarding etiology, this most likely appears to be provoked in the setting of DKA and/or UTI rather than a primary CNS cause. Patient's BP was also quite elevated so there may be a component of PRES.     Plan:  -Continue Keppra.  -EEG is pending. Likely on 7/20/20.  -MR Brain W/WO CST pending.  -Further recommendations pending on MRI.  -Plan discussed with consulting physician and patient's nurse.       Poncho Ibanez M.D., Diplomat of the American Board of Psychiatry and Neurology  Diplomat of [employer]BP[employer] Epilepsy Subspecialty   Assistant Clinical Professor, [facility] Neurology Consultant

## 2020-07-19 NOTE — ASSESSMENT & PLAN NOTE
Secondary recurrent seizure   No clinical history to support sepsis syndrome  White count is elevated but suspect that is reactive

## 2020-07-19 NOTE — ED NOTES
José Luis from Lab called with critical result of Lactic acid -16.6 at 0422. Critical lab result read back to José Luis.   Dr. Guardado notified of critical lab result at 0424.  Critical lab result read back by Dr. Guardado.

## 2020-07-19 NOTE — DIETARY
Brief Nutrition Services Note: DM diet education consult received. Pt currently on vent, RD to follow for diet education when appropriate.

## 2020-07-19 NOTE — ED NOTES
On arrival patient has an NPA in, snoring respirations.  HR 170s.  Patient became agitated, patient confused.  Unable to orient. ERP at bedside

## 2020-07-19 NOTE — ED PROVIDER NOTES
ED Provider Note    CHIEF COMPLAINT  Chief Complaint   Patient presents with   • ALOC       HPI  Dung José is a 44 y.o. male who presents to the emergency department by EMS this evening for seizure.  Per EMS, the patient has not been seen by a doctor for many years.  He went into a primary care physician clinic today and was diagnosed with hypertension and diabetes.  He was given a prescription for metformin which he did not start.  The patient's wife states that he was very anxious following this visit however was feeling well otherwise throughout the day today.  She found him this evening laying in the bed somnolent with dried blood in the mouth.  When EMS arrived, patient appeared postictal but was able to walk down the stairs.  He had another seizure that was witnessed by EMS at that time.  He received 5 mg of IM Versed by them and was transferred here.  He did receive another 5 mg of Versed IV for worsening agitation and transfer.    On arrival here, the patient is somnolent with sonorous respirations.  History is otherwise unable to obtain due to altered mental status.    REVIEW OF SYSTEMS  See HPI for further details.   Review of Systems   Unable to perform ROS: Mental status change   Neurological: Positive for seizures.         PAST MEDICAL HISTORY       SOCIAL HISTORY  Social History     Tobacco Use   • Smoking status: Not on file   Substance and Sexual Activity   • Alcohol use: Not on file   • Drug use: Not on file   • Sexual activity: Not on file       SURGICAL HISTORY  patient denies any surgical history    CURRENT MEDICATIONS  Home Medications    **Home medications have not yet been reviewed for this encounter**         ALLERGIES  No Known Allergies    PHYSICAL EXAM   VITAL SIGNS: /54   Pulse (!) 126   Temp 36.1 °C (97 °F) (Temporal)   Resp (!) 24   Ht 1.829 m (6')   Wt 104.3 kg (230 lb)   SpO2 98%   BMI 31.19 kg/m²      Physical Exam   Constitutional: No distress.   Obese male mildly  agitated with sonorous respirations   HENT:   Head: Normocephalic.   Small amount of dried blood in the oropharynx.  No obvious laceration or dental trauma, poor dentition.   Eyes: Pupils are equal, round, and reactive to light. Conjunctivae are normal.   Neck: Normal range of motion. Neck supple.   Cardiovascular: Regular rhythm and normal heart sounds.   Tachycardic   Pulmonary/Chest: Effort normal and breath sounds normal. No respiratory distress.   Abdominal: Soft. He exhibits no distension. There is no abdominal tenderness.   Musculoskeletal: Normal range of motion.         General: No edema.   Neurological:   Patient is somnolent but withdraws from pain.  Opening his eyes or answering questions.  Appears to be moving all extremities spontaneously.   Skin: Skin is warm.   Diaphoretic   Psychiatric:   Unable to assess         DIAGNOSTIC STUDIES    EKG  Results for orders placed or performed during the hospital encounter of 20   EKG (NOW)   Result Value Ref Range    Report       [facility]    Test Date:  2020  Pt Name:    DELVIS ALCOCER              Department:   MRN:        [id number]                      Room:       [facility]  Gender:     Male                         Technician: [id number]  :        1975                   Requested By:SERGIO DESHPANDE  Order #:    [id number]                    Reading MD:    Measurements  Intervals                                Axis  Rate:       150                          P:          51  OR:         95                           QRS:        -7  QRSD:       110                          T:  QT:         338  QTc:        534    Interpretive Statements  SINUS TACHYCARDIA  PROBABLE LEFT ATRIAL ENLARGEMENT  LVH WITH SECONDARY REPOLARIZATION ABNORMALITY  PROLONGED QT INTERVAL  BASELINE WANDER IN LEAD(S) I,II,aVR,aVL,aVF  No previous ECG available for comparison             LABS  Personally reviewed by me  Labs Reviewed   CBC WITH DIFFERENTIAL - Abnormal; Notable for  the following components:       Result Value    WBC 18.8 (*)     MCHC 32.0 (*)     Neutrophils-Polys 72.20 (*)     Lymphocytes 20.00 (*)     Neutrophils (Absolute) 13.57 (*)     Monos (Absolute) 0.98 (*)     All other components within normal limits    Narrative:     Indicate which anticoagulants the patient is on:->NONE   COMP METABOLIC PANEL - Abnormal; Notable for the following components:    Sodium 133 (*)     Chloride 87 (*)     Co2 9 (*)     Anion Gap 37.0 (*)     Glucose 484 (*)     Creatinine 1.76 (*)     All other components within normal limits    Narrative:     Indicate which anticoagulants the patient is on:->NONE   APTT - Abnormal; Notable for the following components:    APTT 22.6 (*)     All other components within normal limits    Narrative:     Indicate which anticoagulants the patient is on:->NONE   LACTIC ACID - Abnormal; Notable for the following components:    Lactic Acid 16.6 (*)     All other components within normal limits    Narrative:     Indicate which anticoagulants the patient is on:->NONE   ESTIMATED GFR - Abnormal; Notable for the following components:    GFR If  51 (*)     GFR If Non  42 (*)     All other components within normal limits    Narrative:     Indicate which anticoagulants the patient is on:->NONE   MAGNESIUM    Narrative:     Indicate which anticoagulants the patient is on:->NONE   DIFFERENTIAL MANUAL    Narrative:     Indicate which anticoagulants the patient is on:->NONE   PERIPHERAL SMEAR REVIEW    Narrative:     Indicate which anticoagulants the patient is on:->NONE   PLATELET ESTIMATE    Narrative:     Indicate which anticoagulants the patient is on:->NONE   MORPHOLOGY    Narrative:     Indicate which anticoagulants the patient is on:->NONE   URINALYSIS,CULTURE IF INDICATED   TRIGLYCERIDE           RADIOLOGY  Personally reviewed by me  CT-HEAD W/O   Final Result      Head CT without contrast within normal limits. No evidence of acute  cerebral infarction, hemorrhage or mass lesion.      DX-ABDOMEN FOR TUBE PLACEMENT   Final Result      Enteric tube tip projects over the stomach      DX-CHEST-PORTABLE (1 VIEW)   Final Result      1.  No acute cardiac or pulmonary abnormalities are identified. Lung volumes are low.   2.  Line and tube position as described above            PROCEDURES  Intubation Procedure Note    Indication: airway compromise and airway protection    Consent: Unable to be obtained due to the emergent nature of this procedure.    Medications Used: etomidate intravenously and rocuronium intravenously    Procedure: The patient was placed in the appropriate position.  Cricoid pressure was utilized.  Intubation was performed by video laryngoscopy  and an 8.0 cuffed endotracheal tube.  The cuff was then inflated and the tube was secured appropriately at a distance of 25 cm to the dental ridge.  Initial confirmation of placement included bilateral breath sounds, an end tidal CO2 detector, tube fogging and adequate chest rise.  A chest x-ray to verify correct placement of the tube showed appropriate tube position.    The patient tolerated the procedure well.     Complications: None        ED COURSE  Vitals:    07/19/20 0506 07/19/20 0524 07/19/20 0531 07/19/20 0533   BP: 141/74 140/65 108/55 103/54   Pulse: (!) 133 (!) 126 (!) 125 (!) 126   Resp: (!) 24 (!) 26 (!) 23 (!) 24   Temp:       TempSrc:       SpO2: 98% 98% (!) 87% 98%   Weight:       Height:             Medications administered:  Medications   levETIRAcetam (KEPPRA) 4,500 mg in  mL IVPB (has no administration in time range)   etomidate (AMIDATE) injection 30 mg (has no administration in time range)   rocuronium (ZEMURON) injection 100 mg (has no administration in time range)   propofol (DIPRIVAN) injection (has no administration in time range)   NS infusion 1,000 mL (1,000 mL Intravenous New Bag 7/19/20 0430)   LORAZEPAM 2 MG/ML INJ SOLN (2 mg  Given 7/19/20 0430)      Patient was given IV fluids for tachycardia and elevated lactate.  IV hydration was used because oral hydration was not adequate alone.  Following fluid administration patient's vital signs and hydration status were improved.      Old records personally reviewed:  None available      MEDICAL DECISION MAKING  Patient recently diagnosed with hypertension and diabetes however otherwise healthy presents following first-time seizure.  The patient did have a witnessed second seizure by EMS prior to returning back to baseline.  He arrives significantly agitated and postictal.  He is afebrile, tachycardic and hypertensive.  He has sonorous respirations but is not hypoxic and is able to maintain an airway initially.  Soon after this, he became much more agitated requiring further Ativan and intubation was performed for airway protection.    Not appear to have any focal neurologic deficits on exam.  There is no history of trauma or recent infectious symptoms.  CT of the brain does not show evidence of intracranial hemorrhage or mass.  Labs demonstrate a significant lactic acidosis leukocytosis which I assume is due to seizure.  I have no concerns for sepsis at this time.  He is hyperglycemic with associated anion gap but I feel that this is due to the lactic acidosis rather than DKA.  He has no other significant electrolyte abnormalities.  She was loaded with IV Keppra and placed on a propofol drip for status epilepticus.    I discussed the case with Dr. Vasquez, neurology on-call.  He will plan on seeing the patient this morning and recommends EEG and MRI. I discussed the case with Dr. Fraire, intensivist on-call.  He will accept admission of the patient.    Patient's wife was updated on plan of care and agreeable at this time.    CRITICAL CARE TIME  Upon my evaluation, this patient had a high probability of imminent or life-threatening deterioration due to status epilepticus requiring intubation and sedation,  acidosis, hypertensive emergency which required my direct attention, intervention, and personal management.     I personally provided 40 minutes of total critical care time outside of time spent on separately billable/documented procedures. Time includes: review of laboratory data, review of radiology studies, discussion with consultants, discussion with family/patient, monitoring for potential decompensation.  Interventions were performed as documented above.         IMPRESSION  (G40.901) Status epilepticus (HCC)  (E87.2) Lactic acidosis  (I16.1) Hypertensive emergency  (R73.9) Hyperglycemia    Disposition: Admit medicine, critical condition  Results, diagnoses, and treatment options were discussed with the patient and/or family. Patient verbalized understanding of plan of care.    Patient referred to primary care provider for monitoring and treatment of blood pressure.      New Prescriptions    No medications on file           Electronically signed by: Joselin Dumont M.D., 7/19/2020 5:37 AM

## 2020-07-19 NOTE — ASSESSMENT & PLAN NOTE
New onset seizure, recurrent with confused/agitated postictal state  No family history of seizures  Only prodrome for patient was significant anxiety related to seeing a physician in the clinic for the first time in more than a decade  Loaded with Keppra in the ER  MRI brain without evidence of PRESS or any abnormalities  No further episodes of seizures since admission   7/20 EEG done and negative for seizures.     Plan:   Continue Keppra twice daily. Switch IV keppra to PO  Seizure precautions.   Neuro following

## 2020-07-19 NOTE — ED TRIAGE NOTES
Patient was found hanging off the bed with blood coming from his mouth, while walking out with EMS patient had a witnessed seizure.  Patient received 5mg versed.  Snoring respirations.  On arrival patient became combative and received another 5mg versed.  Snoring respirations on arrival NPA in place

## 2020-07-19 NOTE — ASSESSMENT & PLAN NOTE
Cardiac monitoring  Avoid QT prolonging agents as able  Optimize electrolytes  Repeat EKG as needed

## 2020-07-19 NOTE — ASSESSMENT & PLAN NOTE
Wife describes chronic anxiety  Query other chronic neuropsych condition  Reassess when off the ventilator

## 2020-07-19 NOTE — CONSULTS
Critical Care Consultation  (H&P)    Date of consult: 7/19/2020    Referring Physician  Joselin Dumont M.D.    Reason for Consultation  New onset Sz, intubated in ER    History of Presenting Illness  44 y.o. male history of hypertension, prediabetes and anxiety who presented 7/19/2020 with new onset seizure and agitation.  Patient had some bad prior experiences with his medical issues and stopped seeing physicians more than a decade ago.  At that time he had known hypertension.  Strong family history of diabetes.  Today he was seen for the first time in the clinic because he was developing worsening vision issues and there was concern of diabetes on his and the family's part.  He was sent home on metformin which she did not start.  He was told he still have hypertension.  He was quite anxious about medications the clinic visit, blood draws etc. and subsequently his wife found him in the bedroom snoring somnolent not arousable in bed with some blood-tinged secretions from his nose or mouth.  Paramedics responded and during the process of assessing him he sounded post ictal from the wife's description, they thought he would be able to ambulate but he was not super conversive.  He then had a witnessed tonic-clonic seizure and was brought in emergently.  He was agitated on arrival here and received Versed and clinically actually worsened and required intubation mechanical ventilation for safety.  CT scan head was obtained after loading with Keppra and it was negative for any acute process.  He sedate now on the ventilator in the emergency room.  History was taken from the wife at the bedside.  She denied any neurologic signs or symptoms, she also denied anything that might suggest infection such as fever or chills.  No recent head trauma or any other seizure-like activity.  No family history of seizures.  Patient's not any medicines and has no exposures and does not do any street drugs.  He was essentially normal  except for anxiety.    Code Status  Full Code    Review of Systems  Review of Systems   Unable to perform ROS: Acuity of condition     Past Medical History  History of pretension but patient stopped medications years ago  Diagnosed today with prediabetes and hypertension again on his first clinic visit in more than a decade  History of anxiety disorder?    Surgical History   has no past surgical history on file.    Family History  Diabetes mellitus    Social History   Patient works as a , non-smoker/nondrinker, no history of street drugs    Medications  Metformin, just started 7/18 on his first clinic visit, he has not taken his first dose yet  History of noncompliance with BP medicines in the past per wife    Current Facility-Administered Medications   Medication Dose Route Frequency Provider Last Rate Last Dose   • levETIRAcetam (Keppra) 1000 mg in 100 mL NaCl IV premix  1,000 mg Intravenous Q12HRS Clay Vasquez M.D.       • LORazepam (ATIVAN) injection 1-2 mg  1-2 mg Intravenous Q HOUR PRN Greyson Gregory M.D.       • senna-docusate (PERICOLACE or SENOKOT S) 8.6-50 MG per tablet 2 Tab  2 Tab Oral BID Greyson Gregory M.D.   Stopped at 07/19/20 0630    And   • polyethylene glycol/lytes (MIRALAX) PACKET 1 Packet  1 Packet Oral QDAY PRN Greyson Gregory M.D.        And   • magnesium hydroxide (MILK OF MAGNESIA) suspension 30 mL  30 mL Oral QDAY PRN Greyson Gregory M.D.        And   • bisacodyl (DULCOLAX) suppository 10 mg  10 mg Rectal QDAY PRN Greyson Gregory M.D.       • Respiratory Therapy Consult   Nebulization Continuous RT Greyson Gregory M.D.       • ipratropium-albuterol (DUONEB) nebulizer solution  3 mL Nebulization Q2HRS PRN (RT) Greyson Gregory M.D.       • famotidine (PEPCID) tablet 20 mg  20 mg Enteral Tube Q12HRS Greyson Gregory M.D.        Or   • famotidine (PEPCID) injection 20 mg  20 mg Intravenous Q12HRS Greyson Gregory M.D.       • MD Alert...ICU  Electrolyte Replacement per Pharmacy   Other PHARMACY TO DOSE Geryson Gregory M.D.       • lidocaine (XYLOCAINE) 1 % injection 1-2 mL  1-2 mL Tracheal Tube Q30 MIN PRN Greyson Gregory M.D.       • lactated ringers infusion   Intravenous Continuous Greyson Gregory M.D.       • enoxaparin (LOVENOX) inj 40 mg  40 mg Subcutaneous DAILY Greyson Gregory M.D.       • acetaminophen (TYLENOL) tablet 650 mg  650 mg Oral Q6HRS PRN Greyson Gregory M.D.       • labetalol (NORMODYNE/TRANDATE) injection 10 mg  10 mg Intravenous Q6HRS PRN Greyson Gregory M.D.       • insulin regular (HumuLIN R,NovoLIN R) injection  2-9 Units Subcutaneous Q6HRS Greyson Gregory M.D.        And   • glucose 4 g chewable tablet 16 g  16 g Oral Q15 MIN PRN Greyson Gregory M.D.        And   • dextrose 50% (D50W) injection 50 mL  50 mL Intravenous Q15 MIN PRN Greyson Gregory M.D.       • fentaNYL (SUBLIMAZE) injection 25 mcg  25 mcg Intravenous Q HOUR PRN Greyson Gregory M.D.        Or   • fentaNYL (SUBLIMAZE) injection 50 mcg  50 mcg Intravenous Q HOUR PRN Greyson Gregory M.D.        Or   • fentaNYL (SUBLIMAZE) injection 100 mcg  100 mcg Intravenous Q HOUR PRN Greyson Gregory M.D.       • propofol (DIPRIVAN) injection  0-80 mcg/kg/min Intravenous Continuous Greyson Gregory M.D. 25 mL/hr at 07/19/20 0635 40 mcg/kg/min at 07/19/20 0635     No current outpatient medications on file.     Allergies  No Known Allergies    Vital Signs last 24 hours  Temp:  [36.1 °C (97 °F)] 36.1 °C (97 °F)  Pulse:  [] 91  Resp:  [22-61] 22  BP: ()/(50-94) 91/52  SpO2:  [87 %-99 %] 99 %    Physical Exam  Physical Exam  Vitals signs reviewed.   Constitutional:       Appearance: He is obese. He is ill-appearing and diaphoretic. He is not toxic-appearing.      Interventions: He is sedated, intubated and restrained.   HENT:      Head: Normocephalic and atraumatic.      Right Ear: Tympanic membrane normal.      Left Ear: Tympanic  membrane normal.      Mouth/Throat:      Mouth: Mucous membranes are moist.      Dentition: Abnormal dentition.      Comments: No bleeding on exam or at intubation and no obvious lacerations  Eyes:      General: No scleral icterus.     Pupils: Pupils are equal, round, and reactive to light.   Neck:      Musculoskeletal: Neck supple. No neck rigidity.      Vascular: No carotid bruit or JVD.   Cardiovascular:      Rate and Rhythm: Regular rhythm. Tachycardia present.  No extrasystoles are present.     Pulses: Normal pulses.      Heart sounds: No murmur. No gallop.       Comments: Sinus tachycardia  Pulmonary:      Effort: No respiratory distress. He is intubated.      Breath sounds: Rhonchi present. No wheezing or rales.      Comments: No aspiration with intubation per ERP  Abdominal:      General: Abdomen is protuberant. Bowel sounds are normal. There is no distension.      Palpations: Abdomen is soft. There is no mass.      Tenderness: There is no abdominal tenderness.      Hernia: No hernia is present.   Musculoskeletal:      Right lower leg: No edema.      Left lower leg: No edema.   Lymphadenopathy:      Cervical: No cervical adenopathy.   Skin:     General: Skin is warm.      Capillary Refill: Capillary refill takes less than 2 seconds.      Coloration: Skin is not cyanotic.      Nails: There is no clubbing.     Neurological:      General: No focal deficit present.      Mental Status: He is unresponsive.      Cranial Nerves: Cranial nerves are intact.      Motor: No abnormal muscle tone or seizure activity.      Comments: Prior to intubation/sedation patient was agitated and moving all 4 and purposeful   Psychiatric:      Comments: Unable to assess     Fluids  No intake or output data in the 24 hours ending 07/19/20 0652    Laboratory  Recent Results (from the past 48 hour(s))   CBC WITH DIFFERENTIAL    Collection Time: 07/19/20  4:03 AM   Result Value Ref Range    WBC 18.8 (H) 4.8 - 10.8 K/uL    RBC 5.22 4.70 -  6.10 M/uL    Hemoglobin 15.5 14.0 - 18.0 g/dL    Hematocrit 48.5 42.0 - 52.0 %    MCV 92.9 81.4 - 97.8 fL    MCH 29.7 27.0 - 33.0 pg    MCHC 32.0 (L) 33.7 - 35.3 g/dL    RDW 44.7 35.9 - 50.0 fL    Platelet Count 352 164 - 446 K/uL    MPV 9.8 9.0 - 12.9 fL    Neutrophils-Polys 72.20 (H) 44.00 - 72.00 %    Lymphocytes 20.00 (L) 22.00 - 41.00 %    Monocytes 5.20 0.00 - 13.40 %    Eosinophils 1.70 0.00 - 6.90 %    Basophils 0.00 0.00 - 1.80 %    Nucleated RBC 0.00 /100 WBC    Neutrophils (Absolute) 13.57 (H) 1.82 - 7.42 K/uL    Lymphs (Absolute) 3.76 1.00 - 4.80 K/uL    Monos (Absolute) 0.98 (H) 0.00 - 0.85 K/uL    Eos (Absolute) 0.32 0.00 - 0.51 K/uL    Baso (Absolute) 0.00 0.00 - 0.12 K/uL    NRBC (Absolute) 0.00 K/uL    Anisocytosis 2+     Microcytosis 2+    COMP METABOLIC PANEL    Collection Time: 07/19/20  4:03 AM   Result Value Ref Range    Sodium 133 (L) 135 - 145 mmol/L    Potassium 3.8 3.6 - 5.5 mmol/L    Chloride 87 (L) 96 - 112 mmol/L    Co2 9 (LL) 20 - 33 mmol/L    Anion Gap 37.0 (H) 7.0 - 16.0    Glucose 484 (H) 65 - 99 mg/dL    Bun 16 8 - 22 mg/dL    Creatinine 1.76 (H) 0.50 - 1.40 mg/dL    Calcium 9.3 8.5 - 10.5 mg/dL    AST(SGOT) 14 12 - 45 U/L    ALT(SGPT) 24 2 - 50 U/L    Alkaline Phosphatase 90 30 - 99 U/L    Total Bilirubin 0.8 0.1 - 1.5 mg/dL    Albumin 3.7 3.2 - 4.9 g/dL    Total Protein 7.1 6.0 - 8.2 g/dL    Globulin 3.4 1.9 - 3.5 g/dL    A-G Ratio 1.1 g/dL   APTT    Collection Time: 07/19/20  4:03 AM   Result Value Ref Range    APTT 22.6 (L) 24.7 - 36.0 sec   MAGNESIUM    Collection Time: 07/19/20  4:03 AM   Result Value Ref Range    Magnesium 2.3 1.5 - 2.5 mg/dL   LACTIC ACID    Collection Time: 07/19/20  4:03 AM   Result Value Ref Range    Lactic Acid 16.6 (HH) 0.5 - 2.0 mmol/L   DIFFERENTIAL MANUAL    Collection Time: 07/19/20  4:03 AM   Result Value Ref Range    Metamyelocytes 0.90 %    Manual Diff Status PERFORMED    PERIPHERAL SMEAR REVIEW    Collection Time: 07/19/20  4:03 AM   Result  Value Ref Range    Peripheral Smear Review see below    PLATELET ESTIMATE    Collection Time: 20  4:03 AM   Result Value Ref Range    Plt Estimation Normal    MORPHOLOGY    Collection Time: 20  4:03 AM   Result Value Ref Range    RBC Morphology Present     Polychromia 1+     Poikilocytosis 1+     Ovalocytes 1+     Basophilic Stippling Few    ESTIMATED GFR    Collection Time: 20  4:03 AM   Result Value Ref Range    GFR If  51 (A) >60 mL/min/1.73 m 2    GFR If Non  42 (A) >60 mL/min/1.73 m 2   EKG (NOW)    Collection Time: 20  4:42 AM   Result Value Ref Range    Report       [facility]    Test Date:  2020  Pt Name:    DELVIS ALCOCER              Department:   MRN:        [id number]                      Room:       [facility]  Gender:     Male                         Technician: [id number]  :        1975                   Requested By:JOSELIN DESHPANDE  Order #:    [id number]                    Reading MD: Joselin Deshpande MD    Measurements  Intervals                                Axis  Rate:       150                          P:          51  KS:         95                           QRS:        -7  QRSD:       110                          T:  QT:         338  QTc:        534    Interpretive Statements  SINUS TACHYCARDIA  PROBABLE LEFT ATRIAL ENLARGEMENT  LVH WITH SECONDARY REPOLARIZATION ABNORMALITY  PROLONGED QT INTERVAL  BASELINE WANDER IN LEAD(S) I,II,aVR,aVL,aVF  No previous ECG available for comparison  Electronically Signed On 2020 6:37:25 PDT by Joselin Deshpande MD     URINALYSIS CULTURE, IF INDICATED    Collection Time: 20  5:15 AM    Specimen: Urine   Result Value Ref Range    Color Yellow     Character Sl Cloudy (A)     Specific Gravity 1.026 <1.035    Ph 5.0 5.0 - 8.0    Glucose >=1000 (A) Negative mg/dL    Ketones 40 (A) Negative mg/dL    Protein 300 (A) Negative mg/dL    Bilirubin Negative Negative    Urobilinogen, Urine 1.0 Negative     Nitrite Negative Negative    Leukocyte Esterase Negative Negative    Occult Blood Large (A) Negative    Micro Urine Req Microscopic    URINE MICROSCOPIC (W/UA)    Collection Time: 07/19/20  5:15 AM   Result Value Ref Range    WBC 10-20 (A) /hpf    -150 (A) /hpf    Bacteria Negative None /hpf    Epithelial Cells Few /hpf    Epithelial Cells Renal Rare /hpf    Hyaline Cast 3-5 (A) /lpf    Sperm Few /hpf   ISTAT ARTERIAL BLOOD GAS    Collection Time: 07/19/20  5:20 AM   Result Value Ref Range    Ph 7.211 (LL) 7.400 - 7.500    Pco2 47.5 (H) 26.0 - 37.0 mmHg    Po2 397 (H) 64 - 87 mmHg    Tco2 20 20 - 33 mmol/L    S02 100 (H) 93 - 99 %    Hco3 19.0 17.0 - 25.0 mmol/L    BE -9 (L) -4 - 3 mmol/L    Body Temp 37.5 C degrees    O2 Therapy 100 %    iPF Ratio 397     Ph Temp Cammie 7.204 (LL) 7.400 - 7.500    Pco2 Temp Co 48.5 (H) 26.0 - 37.0 mmHg    Po2 Temp Cor 400 (H) 64 - 87 mmHg    Specimen Arterial     Action Range Triggered YES     Inst. Qualified Patient YES    Routine (COVID/SARS COV-2 In-House PCR up to 24 hours)    Collection Time: 07/19/20  5:50 AM    Specimen: Nasopharyngeal; Respirate   Result Value Ref Range    COVID Order Status Received    SARS-CoV-2, PCR (In-House)    Collection Time: 07/19/20  5:50 AM   Result Value Ref Range    SARS-CoV-2 Source NP Swab    HEMOGLOBIN A1C    Collection Time: 07/19/20  6:15 AM   Result Value Ref Range    Glycohemoglobin 14.6 (H) 0.0 - 5.6 %    Est Avg Glucose 372 mg/dL     Imaging  CT-HEAD W/O   Final Result      Head CT without contrast within normal limits. No evidence of acute cerebral infarction, hemorrhage or mass lesion.      DX-ABDOMEN FOR TUBE PLACEMENT   Final Result      Enteric tube tip projects over the stomach      DX-CHEST-PORTABLE (1 VIEW)   Final Result      1.  No acute cardiac or pulmonary abnormalities are identified. Lung volumes are low.   2.  Line and tube position as described above      MR-BRAIN-WITH & W/O    (Results Pending)      Assessment/Plan  * New onset seizure (HCC)  Assessment & Plan  New onset seizure, recurrent with confused/agitated postictal state  No family history of seizures  Only prodrome for patient was significant anxiety related to seeing a physician in the clinic for the first time in more than a decade  ID and neuro ROS negative on review with wife  Loaded with Keppra in the ER  Continue Keppra twice daily  Seizure precautions  vEEG  Neurological consultation  Neurochecks every 4  Sedation with propofol, when stable metabolically SAT/SBT and hopefully extubation later today?  Monitor for need for further neurological evaluation including MRI    Acute respiratory failure with hypoxia (HCC)  Assessment & Plan  Intubated primarily to protect airway  Mildly hypoxic on admission to the ER  New onset seizure, recurrent  Severe agitation/postictal state requiring intubation for safety and to facilitate CT scan head  RT protocols  Ventilator bundle  Serial ABG/chest x-ray/ventilator mechanics review-adjust ventilator as clinically prudent  SAT/SBT when clinically prudent  Propofol infusion/as needed fentanyl for sedation    ANKIT (acute kidney injury) (HCC)  Assessment & Plan  Avoid nephrotoxins  IV fluid resuscitation ongoing  Serial BMP  Monitor urine output, Pérez placed  Check CPK  Probable history of untreated diabetes and hypertension potentially for an extended period time, query CKD  Query component of dehydration  If creatinine remains elevated consider renal ultrasound, patient is at risk for CKD    Lactic acidosis  Assessment & Plan  Secondary recurrent seizure and agitation  No clinical history to support sepsis syndrome  White count is elevated but suspect that is reactive  Close monitoring in the ICU in particular for aspiration pneumonia, chest x-ray is clear    Uncontrolled type 2 diabetes mellitus with hyperglycemia (HCC)  Assessment & Plan  Strong family history of diabetes, diagnosed with prediabetes today  and started on metformin, he has not taken his first dose however per wife  Markedly elevated blood sugar with seizure, in part reactive  Suspect elevated anion gap due to lactic acidosis secondary to seizure and not DKA, will monitor  Beta hydroxybutyric acid pending  Follow BMP  Medium sliding scale insulin therapy  Hypoglycemia protocols  Continuous insulin infusion if clinically prudent, will monitor closely  A1c level pending    Chronic anxiety???  Assessment & Plan  Wife describes chronic anxiety  Query other chronic neuropsych condition  Reassess when off the ventilator    Class 1 obesity due to excess calories without serious comorbidity with body mass index (BMI) of 31.0 to 31.9 in adult  Assessment & Plan  Behavioral modification weight loss to be encouraged as clinically appropriate  Check TSH    Leukocytosis  Assessment & Plan  Strongly suspect reactive only  ID ROS pre-event negative per wife  Monitor-follow-up CBC and monitor fever curve  At risk for aspiration pneumonia with chest x-ray clear    Prolonged Q-T interval on ECG  Assessment & Plan  Cardiac monitoring  Avoid QT prolonging agents as able  Optimize electrolytes  Repeat EKG as needed    Discussed patient condition and risk of morbidity and/or mortality with Family, RN, RT, Pharmacy and ERP.      The patient remains critically ill.  Critical care time = 85 minutes in directly providing and coordinating critical care and extensive data review.  No time overlap and excludes procedures.

## 2020-07-19 NOTE — PROGRESS NOTES
Day intensivist note    Pt is admitted this am for seizures, acute hypoxic resp failure, DKA  Received handoff from Dr. Gregory  Medical record was reviewed,     Assessment  1. New onset Seizure  2. Acute hypoxic resp failure  3. ANKIT  4. Anxiety  5. Prediabetes    Plan;   Continue full vent support, low tidal volume strategy  Serial ABGs  Goal to keep sat >94%  Insulin gtt per DKA protocol   Continue keppra  EEG today   MRI brain this afternoon   Neuro following    I actively manage pt's blood glucose given the labile glucose levels. We decided to do insulin 180 protocol as patient's BG has been within normal range.      The patient remains critically ill. Critical care time = 40 mins in directly providing and coordinating critical care and extensive data review. No time overlap and excludes procedures.     Roberth Lorenzo D.O.

## 2020-07-20 ENCOUNTER — APPOINTMENT (OUTPATIENT)
Dept: RADIOLOGY | Facility: MEDICAL CENTER | Age: 45
DRG: 100 | End: 2020-07-20
Attending: INTERNAL MEDICINE

## 2020-07-20 PROBLEM — I10 ESSENTIAL HYPERTENSION: Status: ACTIVE | Noted: 2020-07-20

## 2020-07-20 LAB
ACTION RANGE TRIGGERED IACRT: NO
ALBUMIN SERPL BCP-MCNC: 2.7 G/DL (ref 3.2–4.9)
ALBUMIN/GLOB SERPL: 1 G/DL
ALP SERPL-CCNC: 64 U/L (ref 30–99)
ALT SERPL-CCNC: 16 U/L (ref 2–50)
ANION GAP SERPL CALC-SCNC: 16 MMOL/L (ref 7–16)
AST SERPL-CCNC: 21 U/L (ref 12–45)
BASE EXCESS BLDA CALC-SCNC: -4 MMOL/L (ref -4–3)
BASOPHILS # BLD AUTO: 0.4 % (ref 0–1.8)
BASOPHILS # BLD AUTO: 0.4 % (ref 0–1.8)
BASOPHILS # BLD: 0.04 K/UL (ref 0–0.12)
BASOPHILS # BLD: 0.05 K/UL (ref 0–0.12)
BILIRUB SERPL-MCNC: 0.5 MG/DL (ref 0.1–1.5)
BODY TEMPERATURE: ABNORMAL DEGREES
BUN SERPL-MCNC: 21 MG/DL (ref 8–22)
CALCIUM SERPL-MCNC: 8.1 MG/DL (ref 8.5–10.5)
CFT BLD TEG: 5.8 MIN (ref 5–10)
CHLORIDE SERPL-SCNC: 104 MMOL/L (ref 96–112)
CHOLEST SERPL-MCNC: 163 MG/DL (ref 100–199)
CLOT ANGLE BLD TEG: 64.6 DEGREES (ref 53–72)
CLOT LYSIS 30M P MA LENFR BLD TEG: 0 % (ref 0–8)
CO2 BLDA-SCNC: 20 MMOL/L (ref 20–33)
CO2 SERPL-SCNC: 18 MMOL/L (ref 20–33)
CREAT SERPL-MCNC: 2.41 MG/DL (ref 0.5–1.4)
CT.EXTRINSIC BLD ROTEM: 1.8 MIN (ref 1–3)
EKG IMPRESSION: NORMAL
EOSINOPHIL # BLD AUTO: 0.04 K/UL (ref 0–0.51)
EOSINOPHIL # BLD AUTO: 0.13 K/UL (ref 0–0.51)
EOSINOPHIL NFR BLD: 0.3 % (ref 0–6.9)
EOSINOPHIL NFR BLD: 1.4 % (ref 0–6.9)
ERYTHROCYTE [DISTWIDTH] IN BLOOD BY AUTOMATED COUNT: 42.1 FL (ref 35.9–50)
ERYTHROCYTE [DISTWIDTH] IN BLOOD BY AUTOMATED COUNT: 44.2 FL (ref 35.9–50)
FIBRINOGEN PPP-MCNC: 471 MG/DL (ref 215–460)
GLOBULIN SER CALC-MCNC: 2.6 G/DL (ref 1.9–3.5)
GLUCOSE BLD-MCNC: 132 MG/DL (ref 65–99)
GLUCOSE BLD-MCNC: 134 MG/DL (ref 65–99)
GLUCOSE BLD-MCNC: 139 MG/DL (ref 65–99)
GLUCOSE BLD-MCNC: 146 MG/DL (ref 65–99)
GLUCOSE BLD-MCNC: 147 MG/DL (ref 65–99)
GLUCOSE BLD-MCNC: 153 MG/DL (ref 65–99)
GLUCOSE BLD-MCNC: 172 MG/DL (ref 65–99)
GLUCOSE BLD-MCNC: 177 MG/DL (ref 65–99)
GLUCOSE BLD-MCNC: 184 MG/DL (ref 65–99)
GLUCOSE BLD-MCNC: 190 MG/DL (ref 65–99)
GLUCOSE BLD-MCNC: 218 MG/DL (ref 65–99)
GLUCOSE BLD-MCNC: 257 MG/DL (ref 65–99)
GLUCOSE SERPL-MCNC: 138 MG/DL (ref 65–99)
HCO3 BLDA-SCNC: 18.9 MMOL/L (ref 17–25)
HCT VFR BLD AUTO: 35.4 % (ref 42–52)
HCT VFR BLD AUTO: 40.2 % (ref 42–52)
HDLC SERPL-MCNC: 30 MG/DL
HGB BLD-MCNC: 11.8 G/DL (ref 14–18)
HGB BLD-MCNC: 13.7 G/DL (ref 14–18)
HOROWITZ INDEX BLDA+IHG-RTO: 330 MM[HG]
IMM GRANULOCYTES # BLD AUTO: 0.04 K/UL (ref 0–0.11)
IMM GRANULOCYTES # BLD AUTO: 0.07 K/UL (ref 0–0.11)
IMM GRANULOCYTES NFR BLD AUTO: 0.4 % (ref 0–0.9)
IMM GRANULOCYTES NFR BLD AUTO: 0.5 % (ref 0–0.9)
INR PPP: 1.01 (ref 0.87–1.13)
INST. QUALIFIED PATIENT IIQPT: YES
LDLC SERPL CALC-MCNC: 69 MG/DL
LYMPHOCYTES # BLD AUTO: 1.24 K/UL (ref 1–4.8)
LYMPHOCYTES # BLD AUTO: 2.45 K/UL (ref 1–4.8)
LYMPHOCYTES NFR BLD: 25.5 % (ref 22–41)
LYMPHOCYTES NFR BLD: 9 % (ref 22–41)
MAGNESIUM SERPL-MCNC: 2.2 MG/DL (ref 1.5–2.5)
MCF BLD TEG: 63.4 MM (ref 50–70)
MCH RBC QN AUTO: 29.4 PG (ref 27–33)
MCH RBC QN AUTO: 29.6 PG (ref 27–33)
MCHC RBC AUTO-ENTMCNC: 33.3 G/DL (ref 33.7–35.3)
MCHC RBC AUTO-ENTMCNC: 34.1 G/DL (ref 33.7–35.3)
MCV RBC AUTO: 86.3 FL (ref 81.4–97.8)
MCV RBC AUTO: 88.7 FL (ref 81.4–97.8)
MONOCYTES # BLD AUTO: 0.68 K/UL (ref 0–0.85)
MONOCYTES # BLD AUTO: 0.74 K/UL (ref 0–0.85)
MONOCYTES NFR BLD AUTO: 4.9 % (ref 0–13.4)
MONOCYTES NFR BLD AUTO: 7.7 % (ref 0–13.4)
NEUTROPHILS # BLD AUTO: 11.68 K/UL (ref 1.82–7.42)
NEUTROPHILS # BLD AUTO: 6.2 K/UL (ref 1.82–7.42)
NEUTROPHILS NFR BLD: 64.6 % (ref 44–72)
NEUTROPHILS NFR BLD: 84.9 % (ref 44–72)
NRBC # BLD AUTO: 0 K/UL
NRBC # BLD AUTO: 0 K/UL
NRBC BLD-RTO: 0 /100 WBC
NRBC BLD-RTO: 0 /100 WBC
O2/TOTAL GAS SETTING VFR VENT: 30 %
PA AA BLD-ACNC: 0 %
PA ADP BLD-ACNC: 0 %
PCO2 BLDA: 28.6 MMHG (ref 26–37)
PCO2 TEMP ADJ BLDA: 27.6 MMHG (ref 26–37)
PH BLDA: 7.43 [PH] (ref 7.4–7.5)
PH TEMP ADJ BLDA: 7.44 [PH] (ref 7.4–7.5)
PHOSPHATE SERPL-MCNC: 4.2 MG/DL (ref 2.5–4.5)
PLATELET # BLD AUTO: 191 K/UL (ref 164–446)
PLATELET # BLD AUTO: 203 K/UL (ref 164–446)
PMV BLD AUTO: 10 FL (ref 9–12.9)
PMV BLD AUTO: 9.7 FL (ref 9–12.9)
PO2 BLDA: 99 MMHG (ref 64–87)
PO2 TEMP ADJ BLDA: 95 MMHG (ref 64–87)
POTASSIUM SERPL-SCNC: 3.4 MMOL/L (ref 3.6–5.5)
PROT SERPL-MCNC: 5.3 G/DL (ref 6–8.2)
PROTHROMBIN TIME: 13.6 SEC (ref 12–14.6)
RBC # BLD AUTO: 3.99 M/UL (ref 4.7–6.1)
RBC # BLD AUTO: 4.66 M/UL (ref 4.7–6.1)
SAO2 % BLDA: 98 % (ref 93–99)
SARS-COV-2 RNA RESP QL NAA+PROBE: NOTDETECTED
SODIUM SERPL-SCNC: 138 MMOL/L (ref 135–145)
SPECIMEN DRAWN FROM PATIENT: ABNORMAL
SPECIMEN SOURCE: NORMAL
TEG ALGORITHM TGALG: NORMAL
TRIGL SERPL-MCNC: 322 MG/DL (ref 0–149)
WBC # BLD AUTO: 13.8 K/UL (ref 4.8–10.8)
WBC # BLD AUTO: 9.6 K/UL (ref 4.8–10.8)

## 2020-07-20 PROCEDURE — 4A00X4Z MEASUREMENT OF CENTRAL NERVOUS ELECTRICAL ACTIVITY, EXTERNAL APPROACH: ICD-10-PCS | Performed by: PSYCHIATRY & NEUROLOGY

## 2020-07-20 PROCEDURE — 71045 X-RAY EXAM CHEST 1 VIEW: CPT

## 2020-07-20 PROCEDURE — 700111 HCHG RX REV CODE 636 W/ 250 OVERRIDE (IP): Performed by: PSYCHIATRY & NEUROLOGY

## 2020-07-20 PROCEDURE — 80061 LIPID PANEL: CPT

## 2020-07-20 PROCEDURE — 82962 GLUCOSE BLOOD TEST: CPT | Mod: 91

## 2020-07-20 PROCEDURE — 95816 EEG AWAKE AND DROWSY: CPT | Mod: 26 | Performed by: PSYCHIATRY & NEUROLOGY

## 2020-07-20 PROCEDURE — 85347 COAGULATION TIME ACTIVATED: CPT

## 2020-07-20 PROCEDURE — 94003 VENT MGMT INPAT SUBQ DAY: CPT

## 2020-07-20 PROCEDURE — A9270 NON-COVERED ITEM OR SERVICE: HCPCS | Performed by: INTERNAL MEDICINE

## 2020-07-20 PROCEDURE — 85576 BLOOD PLATELET AGGREGATION: CPT | Mod: 91

## 2020-07-20 PROCEDURE — 99291 CRITICAL CARE FIRST HOUR: CPT | Performed by: INTERNAL MEDICINE

## 2020-07-20 PROCEDURE — 82803 BLOOD GASES ANY COMBINATION: CPT

## 2020-07-20 PROCEDURE — 85610 PROTHROMBIN TIME: CPT

## 2020-07-20 PROCEDURE — 95816 EEG AWAKE AND DROWSY: CPT | Performed by: PSYCHIATRY & NEUROLOGY

## 2020-07-20 PROCEDURE — 93010 ELECTROCARDIOGRAM REPORT: CPT | Performed by: INTERNAL MEDICINE

## 2020-07-20 PROCEDURE — 770022 HCHG ROOM/CARE - ICU (200)

## 2020-07-20 PROCEDURE — 700102 HCHG RX REV CODE 250 W/ 637 OVERRIDE(OP): Performed by: INTERNAL MEDICINE

## 2020-07-20 PROCEDURE — 700101 HCHG RX REV CODE 250: Performed by: INTERNAL MEDICINE

## 2020-07-20 PROCEDURE — 85384 FIBRINOGEN ACTIVITY: CPT

## 2020-07-20 PROCEDURE — 700111 HCHG RX REV CODE 636 W/ 250 OVERRIDE (IP): Performed by: INTERNAL MEDICINE

## 2020-07-20 PROCEDURE — 36600 WITHDRAWAL OF ARTERIAL BLOOD: CPT

## 2020-07-20 PROCEDURE — 83735 ASSAY OF MAGNESIUM: CPT

## 2020-07-20 PROCEDURE — 80053 COMPREHEN METABOLIC PANEL: CPT

## 2020-07-20 PROCEDURE — 84100 ASSAY OF PHOSPHORUS: CPT

## 2020-07-20 PROCEDURE — 94150 VITAL CAPACITY TEST: CPT

## 2020-07-20 PROCEDURE — 93005 ELECTROCARDIOGRAM TRACING: CPT | Performed by: INTERNAL MEDICINE

## 2020-07-20 PROCEDURE — 700105 HCHG RX REV CODE 258: Performed by: INTERNAL MEDICINE

## 2020-07-20 PROCEDURE — 85025 COMPLETE CBC W/AUTO DIFF WBC: CPT

## 2020-07-20 RX ORDER — LABETALOL HYDROCHLORIDE 5 MG/ML
10 INJECTION, SOLUTION INTRAVENOUS
Status: DISCONTINUED | OUTPATIENT
Start: 2020-07-20 | End: 2020-07-20

## 2020-07-20 RX ORDER — AMLODIPINE BESYLATE 10 MG/1
10 TABLET ORAL
Status: DISCONTINUED | OUTPATIENT
Start: 2020-07-20 | End: 2020-07-22 | Stop reason: HOSPADM

## 2020-07-20 RX ORDER — POTASSIUM CHLORIDE 20 MEQ/1
20 TABLET, EXTENDED RELEASE ORAL ONCE
Status: COMPLETED | OUTPATIENT
Start: 2020-07-20 | End: 2020-07-20

## 2020-07-20 RX ORDER — HYDRALAZINE HYDROCHLORIDE 20 MG/ML
10-20 INJECTION INTRAMUSCULAR; INTRAVENOUS EVERY 6 HOURS PRN
Status: DISCONTINUED | OUTPATIENT
Start: 2020-07-20 | End: 2020-07-22 | Stop reason: HOSPADM

## 2020-07-20 RX ORDER — ATORVASTATIN CALCIUM 40 MG/1
40 TABLET, FILM COATED ORAL EVERY EVENING
Status: DISCONTINUED | OUTPATIENT
Start: 2020-07-20 | End: 2020-07-22 | Stop reason: HOSPADM

## 2020-07-20 RX ORDER — FAMOTIDINE 20 MG/1
20 TABLET, FILM COATED ORAL DAILY
Status: DISCONTINUED | OUTPATIENT
Start: 2020-07-21 | End: 2020-07-20

## 2020-07-20 RX ORDER — INSULIN GLARGINE 100 [IU]/ML
20 INJECTION, SOLUTION SUBCUTANEOUS EVERY EVENING
Status: DISCONTINUED | OUTPATIENT
Start: 2020-07-21 | End: 2020-07-21

## 2020-07-20 RX ORDER — LABETALOL HYDROCHLORIDE 5 MG/ML
10 INJECTION, SOLUTION INTRAVENOUS ONCE
Status: COMPLETED | OUTPATIENT
Start: 2020-07-20 | End: 2020-07-20

## 2020-07-20 RX ORDER — INSULIN GLARGINE 100 [IU]/ML
20 INJECTION, SOLUTION SUBCUTANEOUS ONCE
Status: COMPLETED | OUTPATIENT
Start: 2020-07-20 | End: 2020-07-20

## 2020-07-20 RX ORDER — DEXTROSE MONOHYDRATE 25 G/50ML
50 INJECTION, SOLUTION INTRAVENOUS
Status: DISCONTINUED | OUTPATIENT
Start: 2020-07-20 | End: 2020-07-22 | Stop reason: HOSPADM

## 2020-07-20 RX ORDER — LABETALOL HYDROCHLORIDE 5 MG/ML
10 INJECTION, SOLUTION INTRAVENOUS
Status: DISCONTINUED | OUTPATIENT
Start: 2020-07-20 | End: 2020-07-22 | Stop reason: HOSPADM

## 2020-07-20 RX ADMIN — AMLODIPINE BESYLATE 10 MG: 10 TABLET ORAL at 10:45

## 2020-07-20 RX ADMIN — LABETALOL HYDROCHLORIDE 10 MG: 5 INJECTION, SOLUTION INTRAVENOUS at 06:12

## 2020-07-20 RX ADMIN — LEVETIRACETAM 1000 MG: 10 INJECTION INTRAVENOUS at 05:07

## 2020-07-20 RX ADMIN — INSULIN HUMAN 7 UNITS: 100 INJECTION, SOLUTION PARENTERAL at 20:19

## 2020-07-20 RX ADMIN — PROPOFOL 50 MCG/KG/MIN: 10 INJECTION, EMULSION INTRAVENOUS at 06:04

## 2020-07-20 RX ADMIN — INSULIN HUMAN 4 UNITS: 100 INJECTION, SOLUTION PARENTERAL at 17:40

## 2020-07-20 RX ADMIN — ATORVASTATIN CALCIUM 40 MG: 40 TABLET, FILM COATED ORAL at 17:55

## 2020-07-20 RX ADMIN — PROPOFOL 50 MCG/KG/MIN: 10 INJECTION, EMULSION INTRAVENOUS at 02:32

## 2020-07-20 RX ADMIN — FENTANYL CITRATE 100 MCG: 50 INJECTION INTRAMUSCULAR; INTRAVENOUS at 06:37

## 2020-07-20 RX ADMIN — LABETALOL HYDROCHLORIDE 10 MG: 5 INJECTION, SOLUTION INTRAVENOUS at 13:07

## 2020-07-20 RX ADMIN — FENTANYL CITRATE 100 MCG: 50 INJECTION INTRAMUSCULAR; INTRAVENOUS at 04:37

## 2020-07-20 RX ADMIN — METOPROLOL TARTRATE 25 MG: 25 TABLET, FILM COATED ORAL at 17:55

## 2020-07-20 RX ADMIN — ACETAMINOPHEN 650 MG: 325 TABLET, FILM COATED ORAL at 10:48

## 2020-07-20 RX ADMIN — LABETALOL HYDROCHLORIDE 10 MG: 5 INJECTION, SOLUTION INTRAVENOUS at 07:04

## 2020-07-20 RX ADMIN — POTASSIUM CHLORIDE 20 MEQ: 1500 TABLET, EXTENDED RELEASE ORAL at 10:50

## 2020-07-20 RX ADMIN — INSULIN GLARGINE 20 UNITS: 100 INJECTION, SOLUTION SUBCUTANEOUS at 11:27

## 2020-07-20 RX ADMIN — ENOXAPARIN SODIUM 40 MG: 40 INJECTION SUBCUTANEOUS at 05:07

## 2020-07-20 RX ADMIN — HYDRALAZINE HYDROCHLORIDE 10 MG: 20 INJECTION INTRAMUSCULAR; INTRAVENOUS at 10:58

## 2020-07-20 RX ADMIN — INSULIN HUMAN 3 UNITS: 100 INJECTION, SOLUTION PARENTERAL at 11:27

## 2020-07-20 RX ADMIN — HYDRALAZINE HYDROCHLORIDE 20 MG: 20 INJECTION INTRAMUSCULAR; INTRAVENOUS at 12:00

## 2020-07-20 RX ADMIN — ACETAMINOPHEN 650 MG: 325 TABLET, FILM COATED ORAL at 17:55

## 2020-07-20 RX ADMIN — METOPROLOL TARTRATE 12.5 MG: 25 TABLET, FILM COATED ORAL at 10:46

## 2020-07-20 RX ADMIN — SODIUM CHLORIDE 0.5 UNITS/HR: 9 INJECTION, SOLUTION INTRAVENOUS at 05:11

## 2020-07-20 RX ADMIN — LEVETIRACETAM 1000 MG: 10 INJECTION INTRAVENOUS at 17:47

## 2020-07-20 RX ADMIN — FAMOTIDINE 20 MG: 10 INJECTION, SOLUTION INTRAVENOUS at 05:07

## 2020-07-20 ASSESSMENT — COPD QUESTIONNAIRES
DURING THE PAST 4 WEEKS HOW MUCH DID YOU FEEL SHORT OF BREATH: NONE/LITTLE OF THE TIME
HAVE YOU SMOKED AT LEAST 100 CIGARETTES IN YOUR ENTIRE LIFE: NO/DON'T KNOW
DO YOU EVER COUGH UP ANY MUCUS OR PHLEGM?: NO/ONLY WITH OCCASIONAL COLDS OR INFECTIONS
COPD SCREENING SCORE: 0

## 2020-07-20 ASSESSMENT — PULMONARY FUNCTION TESTS: FVC: 1.7

## 2020-07-20 ASSESSMENT — LIFESTYLE VARIABLES: EVER_SMOKED: NEVER

## 2020-07-20 NOTE — PROGRESS NOTES
Dr Phillips notified of low urine output over last 2 hours; 25mL. New orders for 500mL NS bolus received.

## 2020-07-20 NOTE — DIETARY
Nutrition Services: DM diet education follow up. Pt admitted for new onset seizure with ANKIT and uncontrolled Type II DM per problem list. Pt with hbA1c of 14.6 on 7/19, on SSI and Lantus. RD provided written and verbal information on DM diet with resources for follow up. Pt appeared interested in education and verbalized understanding. Diet= Cardiac, Renal, Diabetic. Reviewed diet orders with pt, pt interested in renal diet as well and RD provided written and verbal education on Renal diet restrictions. Stressed that it is important for pt to review with MD if pt is to continue renal restrictions as outpatient or if these are just temporary restrictions. Pt verbalized understanding.Plan/Rec: Continue diet per MD orders. RD available PRN.

## 2020-07-20 NOTE — CARE PLAN
Problem: Knowledge Deficit  Goal: Knowledge of disease process/condition, treatment plan, diagnostic tests, and medications will improve  Note: POC discussed with the patient. All questions and concerns addressed and answered. Patient is involved in POC and verbalizes the understanding of the disease process, medications, tests and treatments. Questions on POC encouraged throughout care.       Problem: Psychosocial Needs:  Goal: Level of anxiety will decrease  Note: Pt is anxious and forgetful.   Anxiety triggers identified. Calming techniques provided to patient. Patient is involved in POC and is encouraged to verbalize questions and concerns.

## 2020-07-20 NOTE — CARE PLAN
Problem: Venous Thromboembolism (VTW)/Deep Vein Thrombosis (DVT) Prevention:  Goal: Patient will participate in Venous Thrombosis (VTE)/Deep Vein Thrombosis (DVT)Prevention Measures  Outcome: PROGRESSING AS EXPECTED     Problem: Respiratory:  Goal: Respiratory status will improve  Outcome: PROGRESSING AS EXPECTED  Intervention: Assess and monitor pulmonary status  Flowsheets (Taken 7/20/2020 8810)  Respiratory Pattern: Vented  RUL Breath Sounds: Clear  RML Breath Sounds: Clear  RLL Breath Sounds: Diminished  LISA Breath Sounds: Clear  LLL Breath Sounds: Diminished     Problem: Skin Integrity  Goal: Risk for impaired skin integrity will decrease  Outcome: PROGRESSING AS EXPECTED

## 2020-07-20 NOTE — PROCEDURES
ROUTINE ELECTROENCEPHALOGRAM REPORT      Referring provider: Dr. Vasquez.     DOS: 7/20/2020 (total recording of 21 minutes)    INDICATION:  Dung José 44 y.o. male presenting with altered mental status, possible seizure.    CURRENT ANTIEPILEPTIC REGIMEN: Levetiracetam.    TECHNIQUE: 30 channel routine electroencephalogram (EEG) was performed in accordance with the international 10-20 system. The study was reviewed in bipolar and referential montages. The recording examined the patient during wakeful and drowsy state(s).     DESCRIPTION OF THE RECORD:  During the wakefulness, the background showed a symmetrical 9 hz alpha activity posteriorly with amplitude of 70 mV.  There was reactivity to eye closure/opening.  A normal anterior-posterior gradient was noted with faster beta frequencies seen anteriorly.  During drowsiness, theta/delta frequencies were seen.    ACTIVATION PROCEDURES:   Intermittent Photic stimulation was performed in a stepwise fashion from 1 to 30 Hz and elicited a normal response (photic driving), most noticeable in the posterior leads.      ICTAL AND/OR INTERICTAL FINDINGS:   Frequent runs of high amplitude frontal intermittent rhythmic delta activity (FIRDA) noted during the study.  No clinical events or seizures were reported or recorded during the study.     EKG: sampling of the EKG recording demonstrated sinus rhythm.       INTERPRETATION:  This is an abnormal routine EEG recording in the awake and drowsy state(s). Frequent runs of high amplitude frontal intermittent rhythmic delta activity (FIRDA) noted during the study.  The findings may increased risk for seizures, and suggest underlying structural abnormality, cortical irritability, and or increased intracranial pressure.  No seizures captured during the study.  Clinical and radiological correlation is recommended.            Greg Parra MD   Epilepsy and Neurodiagnostics.   Clinical  of Neurology  [facility].   Diplomate in Neurology, Epilepsy, and Electrodiagnostic Medicine.   Office: [phone]  Fax: [phone]

## 2020-07-20 NOTE — RESPIRATORY CARE
Extubation    Cuff leak noted yes  Stridor present no     FiO2%: 30 % (07/20/20 0706)  O2 (LPM): 2 (07/20/20 0859)     Patient toleration good  RCP Complete? yes

## 2020-07-20 NOTE — PROGRESS NOTES
Critical Care Progress Note    Date of admission  7/19/2020    Chief Complaint  44 y.o. male with hx of HTN, prediabetes, anxiety admitted for new onset of seizure and agitation. Pt had witnessed tonic clonic seizures, intubated for airway protection.     Hospital Course    7/19 was on DKA insulin protocol.       Interval Problem Update  Reviewed last 24 hour events:  No acute changes overnight  Remains intubated, pt agitated.   DKA insulin protocol switched to insulin gtt ICU protocol.   Nurse reported after SAT, pt developed agitation RASS 3-4+, back on propofol this am.   Afebrile  HRIn 60-90s  Vent on 30%, PEEP 8, sat >95%, ABG with acceptable gas exchange  WBC 9.6/ platelet 191  Creatinine 2.41, HCO3 18  LFT normal  UOP 90-350cc    Review of Systems  Review of Systems   Reason unable to perform ROS: intubated, sedated, RASS -2, unable to perform.        Vital Signs for last 24 hours   Temp:  [36 °C (96.8 °F)-37.5 °C (99.5 °F)] 37.5 °C (99.5 °F)  Pulse:  [61-98] 91  Resp:  [16-27] 24  BP: ()/() 144/87  SpO2:  [92 %-100 %] 100 %    Hemodynamic parameters for last 24 hours       Respiratory Information for the last 24 hours  Vent Mode: APVCMV  Rate (breaths/min): 24  Vt Target (mL): 550  PEEP/CPAP: 8  P Support: 5  MAP: 14  Length of Weaning Trial (Hours): 5 mins  Control VTE (exp VT): 709    Physical Exam   Physical Exam  Vitals signs and nursing note reviewed.   Constitutional:       General: He is not in acute distress.     Appearance: He is not ill-appearing, toxic-appearing or diaphoretic.      Comments: Minimally follow commands, sedated, intubated   HENT:      Head: Normocephalic and atraumatic.      Mouth/Throat:      Mouth: Mucous membranes are moist.      Comments: ET tube in place  Neck:      Musculoskeletal: Neck supple.   Cardiovascular:      Rate and Rhythm: Normal rate and regular rhythm.      Pulses: Normal pulses.      Heart sounds: Normal heart sounds. No murmur.   Pulmonary:       Effort: Pulmonary effort is normal. No respiratory distress.      Breath sounds: Normal breath sounds. No wheezing, rhonchi or rales.      Comments: Diminished breath sounds.   Abdominal:      General: There is no distension.      Palpations: Abdomen is soft.      Tenderness: There is no abdominal tenderness. There is no guarding.   Musculoskeletal:         General: No swelling or tenderness.   Skin:     General: Skin is warm.      Capillary Refill: Capillary refill takes less than 2 seconds.      Coloration: Skin is not jaundiced.   Neurological:      General: No focal deficit present.      Mental Status: He is alert.      Cranial Nerves: No cranial nerve deficit.      Comments: Moving all extremities, no focal neuro deficit         Medications  Current Facility-Administered Medications   Medication Dose Route Frequency Provider Last Rate Last Dose   • [START ON 7/21/2020] famotidine (PEPCID) tablet 20 mg  20 mg Enteral Tube DAILY Roberth Lorenzo D.O.        Or   • [START ON 7/21/2020] famotidine (PEPCID) injection 20 mg  20 mg Intravenous DAILY Roberth Lorenzo D.O.       • levETIRAcetam (Keppra) 1000 mg in 100 mL NaCl IV premix  1,000 mg Intravenous Q12HRS Clay Vasquez M.D.   Stopped at 07/20/20 0522   • LORazepam (ATIVAN) injection 1-2 mg  1-2 mg Intravenous Q HOUR PRN Greyson Gregory M.D.       • senna-docusate (PERICOLACE or SENOKOT S) 8.6-50 MG per tablet 2 Tab  2 Tab Oral BID Greyson Gregory M.D.   Stopped at 07/19/20 0630    And   • polyethylene glycol/lytes (MIRALAX) PACKET 1 Packet  1 Packet Oral QDAY PRN Greyson Gregory M.D.        And   • magnesium hydroxide (MILK OF MAGNESIA) suspension 30 mL  30 mL Oral QDAY PRN Greyson Gregory M.D.        And   • bisacodyl (DULCOLAX) suppository 10 mg  10 mg Rectal QDAY PRN Greyson Gregory M.D.       • Respiratory Therapy Consult   Nebulization Continuous RT Greyson Gregory M.D.       • ipratropium-albuterol (DUONEB) nebulizer solution  3 mL Nebulization  Q2HRS PRN (RT) Greyson Gregory M.D.       • MD Alert...ICU Electrolyte Replacement per Pharmacy   Other PHARMACY TO DOSE Greyson Gregory M.D.       • lidocaine (XYLOCAINE) 1 % injection 1-2 mL  1-2 mL Tracheal Tube Q30 MIN PRN Greyson Gregory M.D.       • enoxaparin (LOVENOX) inj 40 mg  40 mg Subcutaneous DAILY Greyson Gregory M.D.   40 mg at 07/20/20 0507   • acetaminophen (TYLENOL) tablet 650 mg  650 mg Oral Q6HRS PRN Greyson Gregory M.D.       • labetalol (NORMODYNE/TRANDATE) injection 10 mg  10 mg Intravenous Q6HRS PRN Greyson Gregory M.D.   10 mg at 07/20/20 0704   • fentaNYL (SUBLIMAZE) injection 25 mcg  25 mcg Intravenous Q HOUR PRN Greyson Gregory M.D.        Or   • fentaNYL (SUBLIMAZE) injection 50 mcg  50 mcg Intravenous Q HOUR PRN Greyson Gregory M.D.        Or   • fentaNYL (SUBLIMAZE) injection 100 mcg  100 mcg Intravenous Q HOUR PRN Greyson Gregory M.D.   100 mcg at 07/20/20 0637   • propofol (DIPRIVAN) injection  0-80 mcg/kg/min Intravenous Continuous Greyson Gregory M.D.   Stopped at 07/20/20 0812   • insulin regular (HumuLIN R,NovoLIN R) injection  0-14 Units Intravenous Once Roberth Lorenzo D.O.   Stopped at 07/19/20 1915   • insulin regular human (HUMULIN/NOVOLIN R) 62.5 Units in  mL infusion per protocol  0-29 Units/hr Intravenous Continuous Roberth Lorenzo D.O. 2 mL/hr at 07/20/20 0821 0.5 Units/hr at 07/20/20 0821   • dextrose 50% (D50W) injection 25-50 mL  25-50 mL Intravenous PRN ANIYAH DegrootOReuben           Fluids    Intake/Output Summary (Last 24 hours) at 7/20/2020 0821  Last data filed at 7/20/2020 0600  Gross per 24 hour   Intake 3461.57 ml   Output 1270 ml   Net 2191.57 ml       Laboratory  Recent Labs     07/19/20  0520 07/20/20  0416   ISTATAPH 7.211* 7.430   ISTATAPCO2 47.5* 28.6   ISTATAPO2 397* 99*   ISTATATCO2 20 20   [id number] 100* 98   ISTATARTHCO3 19.0 18.9   ISTATARTBE -9* -4   ISTATTEMP 37.5 C 36.2 C   ISTATFIO2 100 30   ISTATSPEC Arterial Arterial    ISTATAPHTC 7.204* 7.442   [id number] 400* 95*     Recent Labs     07/19/20  0615   CPKTOTAL 125     Recent Labs     07/19/20  0820 07/19/20  1333 07/19/20  1720 07/20/20  0510   SODIUM 132* 138 138 138   POTASSIUM 4.3 3.7 3.3* 3.4*   CHLORIDE 95* 102 104 104   CO2 16* 19* 19* 18*   BUN 19 21 20 21   CREATININE 1.91* 2.39* 2.10* 2.41*   MAGNESIUM 2.2  --  2.1 2.2   PHOSPHORUS 2.6  --  1.8* 4.2   CALCIUM 7.7* 8.4* 7.8* 8.1*     Recent Labs     07/19/20  0403  07/19/20  0820 07/19/20  1333 07/19/20  1720 07/20/20  0510   ALTSGPT 24  --  17  --   --  16   ASTSGOT 14  --  12  --   --  21   ALKPHOSPHAT 90  --  65  --   --  64   TBILIRUBIN 0.8  --  0.7  --   --  0.5   GLUCOSE 484*   < > 538* 115* 136* 138*    < > = values in this interval not displayed.     Recent Labs     07/19/20  0403 07/19/20  0820 07/20/20  0510   WBC 18.8* 15.0* 9.6   NEUTSPOLYS 72.20* 81.90* 64.60   LYMPHOCYTES 20.00* 10.40* 25.50   MONOCYTES 5.20 6.90 7.70   EOSINOPHILS 1.70 0.00 1.40   BASOPHILS 0.00 0.10 0.40   ASTSGOT 14 12 21   ALTSGPT 24 17 16   ALKPHOSPHAT 90 65 64   TBILIRUBIN 0.8 0.7 0.5     Recent Labs     07/19/20  0403 07/19/20  0820 07/20/20  0510   RBC 5.22 4.14* 3.99*   HEMOGLOBIN 15.5 12.3* 11.8*   HEMATOCRIT 48.5 37.5* 35.4*   PLATELETCT 352 211 191   APTT 22.6*  --   --        Imaging  X-Ray:  I have personally reviewed the images and compared with prior images.    Assessment/Plan  * New onset seizure (HCC)  Assessment & Plan  New onset seizure, recurrent with confused/agitated postictal state  No family history of seizures  Only prodrome for patient was significant anxiety related to seeing a physician in the clinic for the first time in more than a decade  Loaded with Keppra in the ER  MRI brain without evidence of PRESS or any abnormalities  No further episodes of seizures since admission     Plan:   Continue Keppra twice daily  Seizure precautions  EEG done today, pending reading.   Neuro following      Essential  hypertension  Assessment & Plan  BP was high in 240s at admission with HR in 120s. ECG with NSR  Post extubation in 180s  Will start norvasc 10mg daily and metoprolol 25 BID  Goal to keep -120s and HR <120s.   PRN antiHTN with labetalol or hydralazine   Check formal TTE      ANKIT (acute kidney injury) ([facility])  Assessment & Plan  Concern if patient has preexisting renal disease due to his uncontrolled diabetes.   Creatinine was slowly rising while in ICU though today he's making excellent UOP without any lasix  Monitor UOP, strict I/O    Uncontrolled type 2 diabetes mellitus with hyperglycemia ([facility])  Assessment & Plan  HA1C 14, hyperglycemia/DKA at time of admission, off insulin gtt now    Plan:   Transition to lantus with ISS    Acute respiratory failure with hypoxia (HCC)  Assessment & Plan  Intubated primarily to protect airway  New onset seizure  Doing well on SAT and SBT today and we subsequently extubated today   I evaluated patient post extubation and pt is breathing well     Lactic acidosis  Assessment & Plan  Secondary recurrent seizure   No clinical history to support sepsis syndrome  White count is elevated but suspect that is reactive      Leukocytosis  Assessment & Plan  Strongly suspect reactive only  Monitor-follow-up CBC and monitor fever curve  At risk for aspiration pneumonia with chest x-ray clear     I updated patient after extubation and girlfriend was also at bedside during my update. All questions were answered.     VTE:  Heparin  Ulcer: H2 Antagonist  Lines: Central Line  Ongoing indication addressed    I have performed a physical exam and reviewed and updated ROS and Plan today (7/20/2020). In review of yesterday's note (7/19/2020), there are no changes except as documented above.     Discussed patient condition and risk of morbidity and/or mortality with Family, RN, RT, Charge nurse / hot rounds and Patient  The patient remains critically ill.  Critical care time = 45 minutes in directly  providing and coordinating critical care and extensive data review.  No time overlap and excludes procedures.

## 2020-07-21 ENCOUNTER — APPOINTMENT (OUTPATIENT)
Dept: CARDIOLOGY | Facility: MEDICAL CENTER | Age: 45
DRG: 100 | End: 2020-07-21
Attending: INTERNAL MEDICINE

## 2020-07-21 LAB
ANION GAP SERPL CALC-SCNC: 15 MMOL/L (ref 7–16)
BASOPHILS # BLD AUTO: 0.3 % (ref 0–1.8)
BASOPHILS # BLD: 0.04 K/UL (ref 0–0.12)
BUN SERPL-MCNC: 13 MG/DL (ref 8–22)
CALCIUM SERPL-MCNC: 8.9 MG/DL (ref 8.5–10.5)
CHLORIDE SERPL-SCNC: 100 MMOL/L (ref 96–112)
CO2 SERPL-SCNC: 21 MMOL/L (ref 20–33)
CREAT SERPL-MCNC: 1.31 MG/DL (ref 0.5–1.4)
EOSINOPHIL # BLD AUTO: 0.15 K/UL (ref 0–0.51)
EOSINOPHIL NFR BLD: 1.3 % (ref 0–6.9)
ERYTHROCYTE [DISTWIDTH] IN BLOOD BY AUTOMATED COUNT: 42.7 FL (ref 35.9–50)
GLUCOSE BLD-MCNC: 169 MG/DL (ref 65–99)
GLUCOSE BLD-MCNC: 185 MG/DL (ref 65–99)
GLUCOSE BLD-MCNC: 221 MG/DL (ref 65–99)
GLUCOSE BLD-MCNC: 227 MG/DL (ref 65–99)
GLUCOSE BLD-MCNC: 245 MG/DL (ref 65–99)
GLUCOSE SERPL-MCNC: 153 MG/DL (ref 65–99)
HCT VFR BLD AUTO: 42.4 % (ref 42–52)
HGB BLD-MCNC: 14.2 G/DL (ref 14–18)
IMM GRANULOCYTES # BLD AUTO: 0.06 K/UL (ref 0–0.11)
IMM GRANULOCYTES NFR BLD AUTO: 0.5 % (ref 0–0.9)
LV EJECT FRACT  99904: 60
LV EJECT FRACT MOD 4C 99902: 40.57
LYMPHOCYTES # BLD AUTO: 1.9 K/UL (ref 1–4.8)
LYMPHOCYTES NFR BLD: 16.3 % (ref 22–41)
MAGNESIUM SERPL-MCNC: 2 MG/DL (ref 1.5–2.5)
MCH RBC QN AUTO: 29.2 PG (ref 27–33)
MCHC RBC AUTO-ENTMCNC: 33.5 G/DL (ref 33.7–35.3)
MCV RBC AUTO: 87.2 FL (ref 81.4–97.8)
MONOCYTES # BLD AUTO: 0.9 K/UL (ref 0–0.85)
MONOCYTES NFR BLD AUTO: 7.7 % (ref 0–13.4)
NEUTROPHILS # BLD AUTO: 8.61 K/UL (ref 1.82–7.42)
NEUTROPHILS NFR BLD: 73.9 % (ref 44–72)
NRBC # BLD AUTO: 0 K/UL
NRBC BLD-RTO: 0 /100 WBC
PHOSPHATE SERPL-MCNC: 4.7 MG/DL (ref 2.5–4.5)
PLATELET # BLD AUTO: 227 K/UL (ref 164–446)
PMV BLD AUTO: 9.6 FL (ref 9–12.9)
POTASSIUM SERPL-SCNC: 3.3 MMOL/L (ref 3.6–5.5)
RBC # BLD AUTO: 4.86 M/UL (ref 4.7–6.1)
SODIUM SERPL-SCNC: 136 MMOL/L (ref 135–145)
WBC # BLD AUTO: 11.7 K/UL (ref 4.8–10.8)

## 2020-07-21 PROCEDURE — 80048 BASIC METABOLIC PNL TOTAL CA: CPT

## 2020-07-21 PROCEDURE — 700111 HCHG RX REV CODE 636 W/ 250 OVERRIDE (IP): Performed by: PSYCHIATRY & NEUROLOGY

## 2020-07-21 PROCEDURE — 83735 ASSAY OF MAGNESIUM: CPT

## 2020-07-21 PROCEDURE — 700102 HCHG RX REV CODE 250 W/ 637 OVERRIDE(OP): Performed by: INTERNAL MEDICINE

## 2020-07-21 PROCEDURE — 82962 GLUCOSE BLOOD TEST: CPT | Mod: 91

## 2020-07-21 PROCEDURE — 85025 COMPLETE CBC W/AUTO DIFF WBC: CPT

## 2020-07-21 PROCEDURE — 93306 TTE W/DOPPLER COMPLETE: CPT | Mod: 26 | Performed by: INTERNAL MEDICINE

## 2020-07-21 PROCEDURE — 700111 HCHG RX REV CODE 636 W/ 250 OVERRIDE (IP): Performed by: INTERNAL MEDICINE

## 2020-07-21 PROCEDURE — A9270 NON-COVERED ITEM OR SERVICE: HCPCS | Performed by: INTERNAL MEDICINE

## 2020-07-21 PROCEDURE — 770020 HCHG ROOM/CARE - TELE (206)

## 2020-07-21 PROCEDURE — 99233 SBSQ HOSP IP/OBS HIGH 50: CPT | Performed by: HOSPITALIST

## 2020-07-21 PROCEDURE — 700117 HCHG RX CONTRAST REV CODE 255: Performed by: INTERNAL MEDICINE

## 2020-07-21 PROCEDURE — 93306 TTE W/DOPPLER COMPLETE: CPT

## 2020-07-21 PROCEDURE — 84100 ASSAY OF PHOSPHORUS: CPT

## 2020-07-21 PROCEDURE — 99233 SBSQ HOSP IP/OBS HIGH 50: CPT | Performed by: INTERNAL MEDICINE

## 2020-07-21 RX ORDER — INSULIN GLARGINE 100 [IU]/ML
5 INJECTION, SOLUTION SUBCUTANEOUS ONCE
Status: COMPLETED | OUTPATIENT
Start: 2020-07-21 | End: 2020-07-21

## 2020-07-21 RX ORDER — INSULIN GLARGINE 100 [IU]/ML
25 INJECTION, SOLUTION SUBCUTANEOUS EVERY EVENING
Status: DISCONTINUED | OUTPATIENT
Start: 2020-07-21 | End: 2020-07-22 | Stop reason: HOSPADM

## 2020-07-21 RX ORDER — LEVETIRACETAM 500 MG/1
1000 TABLET ORAL 2 TIMES DAILY
Status: DISCONTINUED | OUTPATIENT
Start: 2020-07-21 | End: 2020-07-22 | Stop reason: HOSPADM

## 2020-07-21 RX ORDER — POTASSIUM CHLORIDE 20 MEQ/1
40 TABLET, EXTENDED RELEASE ORAL EVERY 6 HOURS
Status: COMPLETED | OUTPATIENT
Start: 2020-07-21 | End: 2020-07-21

## 2020-07-21 RX ADMIN — LABETALOL HYDROCHLORIDE 10 MG: 5 INJECTION, SOLUTION INTRAVENOUS at 19:59

## 2020-07-21 RX ADMIN — INSULIN HUMAN 3 UNITS: 100 INJECTION, SOLUTION PARENTERAL at 08:27

## 2020-07-21 RX ADMIN — ENOXAPARIN SODIUM 40 MG: 40 INJECTION SUBCUTANEOUS at 05:28

## 2020-07-21 RX ADMIN — ACETAMINOPHEN 650 MG: 325 TABLET, FILM COATED ORAL at 05:31

## 2020-07-21 RX ADMIN — HUMAN ALBUMIN MICROSPHERES AND PERFLUTREN 3 ML: 10; .22 INJECTION, SOLUTION INTRAVENOUS at 15:02

## 2020-07-21 RX ADMIN — ATORVASTATIN CALCIUM 40 MG: 40 TABLET, FILM COATED ORAL at 17:38

## 2020-07-21 RX ADMIN — POTASSIUM CHLORIDE 40 MEQ: 1500 TABLET, EXTENDED RELEASE ORAL at 08:53

## 2020-07-21 RX ADMIN — INSULIN GLARGINE 25 UNITS: 100 INJECTION, SOLUTION SUBCUTANEOUS at 21:25

## 2020-07-21 RX ADMIN — INSULIN HUMAN 4 UNITS: 100 INJECTION, SOLUTION PARENTERAL at 12:07

## 2020-07-21 RX ADMIN — INSULIN HUMAN 4 UNITS: 100 INJECTION, SOLUTION PARENTERAL at 21:26

## 2020-07-21 RX ADMIN — INSULIN HUMAN 3 UNITS: 100 INJECTION, SOLUTION PARENTERAL at 17:31

## 2020-07-21 RX ADMIN — INSULIN GLARGINE 5 UNITS: 100 INJECTION, SOLUTION SUBCUTANEOUS at 10:27

## 2020-07-21 RX ADMIN — POTASSIUM CHLORIDE 40 MEQ: 1500 TABLET, EXTENDED RELEASE ORAL at 14:09

## 2020-07-21 RX ADMIN — AMLODIPINE BESYLATE 10 MG: 10 TABLET ORAL at 05:28

## 2020-07-21 RX ADMIN — METOPROLOL TARTRATE 25 MG: 25 TABLET, FILM COATED ORAL at 05:28

## 2020-07-21 RX ADMIN — LEVETIRACETAM 1000 MG: 500 TABLET ORAL at 17:38

## 2020-07-21 RX ADMIN — LEVETIRACETAM 1000 MG: 10 INJECTION INTRAVENOUS at 05:27

## 2020-07-21 RX ADMIN — DOCUSATE SODIUM 50 MG AND SENNOSIDES 8.6 MG 2 TABLET: 8.6; 5 TABLET, FILM COATED ORAL at 17:38

## 2020-07-21 RX ADMIN — METOPROLOL TARTRATE 25 MG: 25 TABLET, FILM COATED ORAL at 17:38

## 2020-07-21 ASSESSMENT — ENCOUNTER SYMPTOMS
VOMITING: 0
CHILLS: 0
DIARRHEA: 0
SHORTNESS OF BREATH: 0
FEVER: 0
WEAKNESS: 0
HEADACHES: 0
BACK PAIN: 0
ABDOMINAL PAIN: 0
SEIZURES: 0
NAUSEA: 0
COUGH: 0

## 2020-07-21 NOTE — PROGRESS NOTES
[facility] Medicine Daily Progress Note    Date of Service  7/21/2020    Chief Complaint  44 y.o. male admitted 7/19/2020 with GCT seizures    Hospital Course    Admitted to ICU, intubated, extubated, deemed stable for floor 7/21.  Insulin ggt weaned to SQ regimine as delinated below.       Interval Problem Update  No acute overnight events.     Consultants/Specialty  Neuro  CC/Pulm    Code Status  Full    Disposition  Pending.  Needs diabetic education re: insulins.      Review of Systems  Review of Systems   Genitourinary: Positive for frequency.   All other systems reviewed and are negative.       Physical Exam  Temp:  [36.4 °C (97.6 °F)-38.2 °C (100.8 °F)] 36.4 °C (97.6 °F)  Pulse:  [] 97  Resp:  [13-27] 21  BP: (104-171)/() 144/71  SpO2:  [90 %-97 %] 94 %    General: No acute distress  HEENT atraumatic, normocephalic, pupils equal round reactive to light  Chest: Respirations are unlabored  Cardiac: Physiologic S1 and S2  Abdomen: Soft, nontender, nondistended  Extremities: Without clubbing, cyanosis or edema  Neuro: Cranial nerves II through XII are grossly intact.      Current Facility-Administered Medications:   •  insulin glargine (Lantus) injection, 25 Units, Subcutaneous, Q EVENING, TRACIE Degroot.O.  •  levETIRAcetam (KEPPRA) tablet 1,000 mg, 1,000 mg, Oral, BID, Roberth Lorenzo D.O.  •  amLODIPine (NORVASC) tablet 10 mg, 10 mg, Oral, Q DAY, Roberth Lorenzo D.O., 10 mg at 07/21/20 0528  •  hydrALAZINE (APRESOLINE) injection 10-20 mg, 10-20 mg, Intravenous, Q6HRS PRN, Roberth Lorenzo D.O., 20 mg at 07/20/20 1200  •  insulin regular (HumuLIN R,NovoLIN R) injection, 3-14 Units, Subcutaneous, 4X/DAY ACHS, 4 Units at 07/21/20 1207 **AND** POC Blood Glucose, , , Q AC AND BEDTIME(S) **AND** NOTIFY MD and PharmD, , , Once **AND** glucose 4 g chewable tablet 16 g, 16 g, Oral, Q15 MIN PRN **AND** dextrose 50% (D50W) injection 50 mL, 50 mL, Intravenous, Q15 MIN PRN, Roberth Lorenzo D.O.  •  labetalol (NORMODYNE/TRANDATE)  injection 10 mg, 10 mg, Intravenous, Q HOUR PRN, Roberth Lorenzo D.O.  •  atorvastatin (LIPITOR) tablet 40 mg, 40 mg, Oral, Q EVENING, ANIYAH DegrootO., 40 mg at 07/20/20 1755  •  metoprolol (LOPRESSOR) tablet 25 mg, 25 mg, Oral, TWICE DAILY, ANIYAH DegrootO., 25 mg at 07/21/20 0528  •  LORazepam (ATIVAN) injection 1-2 mg, 1-2 mg, Intravenous, Q HOUR PRN, Greyson Gregory M.D.  •  senna-docusate (PERICOLACE or SENOKOT S) 8.6-50 MG per tablet 2 Tab, 2 Tab, Oral, BID, Stopped at 07/19/20 0630 **AND** polyethylene glycol/lytes (MIRALAX) PACKET 1 Packet, 1 Packet, Oral, QDAY PRN **AND** magnesium hydroxide (MILK OF MAGNESIA) suspension 30 mL, 30 mL, Oral, QDAY PRN **AND** bisacodyl (DULCOLAX) suppository 10 mg, 10 mg, Rectal, QDAY PRN, Greyson Gregory M.D.  •  Respiratory Therapy Consult, , Nebulization, Continuous RT, Greyson Gregory M.D.  •  ipratropium-albuterol (DUONEB) nebulizer solution, 3 mL, Nebulization, Q2HRS PRN (RT), Greyson Gregory M.D.  •  MD Alert...ICU Electrolyte Replacement per Pharmacy, , Other, PHARMACY TO DOSE, Greyson Gregory M.D.  •  enoxaparin (LOVENOX) inj 40 mg, 40 mg, Subcutaneous, DAILY, Greyson Gregory M.D., 40 mg at 07/21/20 0528  •  acetaminophen (TYLENOL) tablet 650 mg, 650 mg, Oral, Q6HRS PRN, Greyson Gregory M.D., 650 mg at 07/21/20 0531      Fluids    Intake/Output Summary (Last 24 hours) at 7/21/2020 1601  Last data filed at 7/21/2020 1300  Gross per 24 hour   Intake 1320 ml   Output 1825 ml   Net -505 ml       Laboratory  Recent Labs     07/20/20  0510 07/20/20  1220 07/21/20  0545   WBC 9.6 13.8* 11.7*   RBC 3.99* 4.66* 4.86   HEMOGLOBIN 11.8* 13.7* 14.2   HEMATOCRIT 35.4* 40.2* 42.4   MCV 88.7 86.3 87.2   MCH 29.6 29.4 29.2   MCHC 33.3* 34.1 33.5*   RDW 44.2 42.1 42.7   PLATELETCT 191 203 227   MPV 10.0 9.7 9.6     Recent Labs     07/19/20  1720 07/20/20  0510 07/21/20  0545   SODIUM 138 138 136   POTASSIUM 3.3* 3.4* 3.3*   CHLORIDE 104 104 100   CO2 19* 18* 21    GLUCOSE 136* 138* 153*   BUN 20 21 13   CREATININE 2.10* 2.41* 1.31   CALCIUM 7.8* 8.1* 8.9     Recent Labs     07/19/20  0403 07/20/20  1220   APTT 22.6*  --    INR  --  1.01         Recent Labs     07/19/20  0615 07/19/20  0820 07/20/20  0510   TRIGLYCERIDE 219* 233* 322*   HDL  --   --  30*   LDL  --   --  69       Imaging  EC-ECHOCARDIOGRAM COMPLETE W/ CONT   Final Result      DX-CHEST-PORTABLE (1 VIEW)   Final Result         1.  No acute cardiopulmonary disease.   2.  Cardiomegaly      MR-BRAIN-WITH & W/O   Final Result      1.  There is no evidence of mesial temporal sclerosis.   2.  There is no evidence of cortical dysplasia or neuronal migrational abnormality.   3.  A few nonspecific T2 hyperintensities in the supratentorial white matter likely representing nonspecific foci of gliosis/chronic ischemia.      CT-HEAD W/O   Final Result      Head CT without contrast within normal limits. No evidence of acute cerebral infarction, hemorrhage or mass lesion.      DX-ABDOMEN FOR TUBE PLACEMENT   Final Result      Enteric tube tip projects over the stomach      DX-CHEST-PORTABLE (1 VIEW)   Final Result      1.  No acute cardiac or pulmonary abnormalities are identified. Lung volumes are low.   2.  Line and tube position as described above           Assessment/Plan  * New onset seizure (HCC)  Assessment & Plan  AED's under guiadance of Neuro on consult.     ANKIT (acute kidney injury) ([facility])  Assessment & Plan  Improving with IVF.      Uncontrolled type 2 diabetes mellitus with hyperglycemia (HCC)  Assessment & Plan  Con't insulins, IVF, close electrolyte monitoring.  Need DM teaching.      Acute respiratory failure with hypoxia (HCC)  Assessment & Plan  Resolved, stable s/p extubation.      Lactic acidosis  Assessment & Plan  Resolved. CTM.         VTE prophylaxis: LMWH

## 2020-07-21 NOTE — CARE PLAN
Problem: Respiratory:  Goal: Respiratory status will improve  Outcome: PROGRESSING AS EXPECTED  Intervention: Assess and monitor pulmonary status  Note: Needed 1 L NC while sleeping.      Problem: Safety  Goal: Will remain free from injury  Outcome: MET  Intervention: Provide assistance with mobility  Note: Call light within reach, bed alarm on, educated on calling when in need of assistance, lower bed rails up x2.

## 2020-07-21 NOTE — PROGRESS NOTES
ICU tranfer:    45 y/o presented with seizures, newly diagnosed with DM and was in DKA. HTN urgency  Started on keppra, norvasc,  EEG neg  On lanuts now    Stable        Transferring Hospitalist: Dr. Almonte

## 2020-07-21 NOTE — DISCHARGE PLANNING
Care Transition Team Assessment  Spoke w/ SO Davina [phone]. Verified and updated facesheet info. Ex wife Kimberly [phone]. Mother Najma [phone]. 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 Davina states she was visited by PFA yesterday and provided Medicaid application but has no access to pt's financial info at the moment and will have to wait for pt to be more alert and oriented. Davina odell pt does not have health insurance and last medical appt was 12 years ago because pt had traumatic experience w/ medical care in the past. Pt visited the free clinic at the [facility] last Saturday for vision problems and was prescribed metformin. Due to vision issues, pt was noted to be stumbling. Being forgetful and unable to drive during the past few weeks.     Information Source  Orientation : Disoriented to Time  Information Given By: Significant Other  Informant's Name: Davina Sifuentes         Elopement Risk  Legal Hold: No  Ambulatory or Self Mobile in Wheelchair: No-Not an Elopement Risk  Elopement Risk: Not at Risk for Elopement    Interdisciplinary Discharge Planning  Lives with - Patient's Self Care Capacity: Significant Other  Support Systems: Family Member(s), Spouse / Significant Other  Mobility Issues: Yes  Prior Services: Home-Independent  Assistance Needed: Unknown at this Time    Discharge Preparedness  What is your plan after discharge?: Uncertain - pending medical team collaboration  What are your discharge supports?: Partner  Prior Functional Level: Ambulatory, Independent with Activities of Daily Living    Functional Assesment  Prior Functional Level: Ambulatory, Independent with Activities of Daily Living    Finances  Financial Barriers to Discharge: Yes              Advance Directive  Advance Directive?: None              Discharge Risks or Barriers  Discharge risks or barriers?: No PCP, Uninsured / underinsured  Patient risk factors: Uninsured or underinsured    Anticipated Discharge  Information  Anticipated discharge disposition: Discharge needs currently unknown  Discharge Address: [address], [place] [address]  Discharge Contact Phone Number: [phone]

## 2020-07-21 NOTE — PROGRESS NOTES
Monitor Summary:   SR-ST : 's  0.16/0.08/0.56      12 hour chart check   2 RN skin check complete

## 2020-07-21 NOTE — ASSESSMENT & PLAN NOTE
BP was high in 240s at admission with HR in 120s. ECG with NSR  Post extubation in 180s  7/20 started on norvasc 10daily and metoprolol 25 BID    Plan:   Continue norvasc and metoprolol  Adjust as needed  Goal to keep -120s and HR <120s.   PRN antiHTN with labetalol or hydralazine   Check formal TTE     72

## 2020-07-21 NOTE — CARE PLAN
"  Problem: Knowledge Deficit  Goal: Knowledge of disease process/condition, treatment plan, diagnostic tests, and medications will improve  Note: POC discussed with the patient. All questions and concerns addressed and answered. Patient is involved in POC and verbalizes the understanding of the disease process, medications, tests and treatments. Questions on POC encouraged throughout care.       Problem: Psychosocial Needs:  Goal: Level of anxiety will decrease  Note: Pt is intermittently confused and has hard time understanding simple directions such as 'sit in the chair' or \"wipe self after going to the bathroom.\" Requires frequent redirection at times.    Anxiety triggers identified. Calming techniques provided to patient. Patient is involved in POC and is encouraged to verbalize questions and concerns.       "

## 2020-07-21 NOTE — PROGRESS NOTES
Critical Care Progress Note    Date of admission  7/19/2020    Chief Complaint  44 y.o. male with hx of HTN, prediabetes, anxiety admitted for new onset of seizure and agitation. Pt had witnessed tonic clonic seizures, intubated for airway protection.     Hospital Course    7/19 was on DKA insulin protocol.  7/20 switched to ICU insulin protocol and transitioned to lantus 15 units daily with ISS.   7/20 Extubated.        Interval Problem Update  Reviewed last 24 hour events:  No acute changes overnight  Extubated yesterday, resp status is well  HR in 70-90s  SBP in 120-160s overnight  Afebrile  WBC 9.6/ platelet 191, hgb 14.2  Creatinine 1.3 improving from 2.41, HCO3 21  LFT normal  UOP 90-350cc    Review of Systems  Review of Systems   Constitutional: Negative for chills, fever and malaise/fatigue.   Respiratory: Negative for cough and shortness of breath.    Cardiovascular: Negative for chest pain and leg swelling.   Gastrointestinal: Negative for abdominal pain, diarrhea, nausea and vomiting.   Musculoskeletal: Negative for back pain.   Neurological: Negative for seizures, weakness and headaches.   All other systems reviewed and are negative.       Vital Signs for last 24 hours   Temp:  [36.6 °C (97.9 °F)-38.6 °C (101.5 °F)] 37.5 °C (99.5 °F)  Pulse:  [] 79  Resp:  [13-28] 15  BP: (122-198)/() 147/93  SpO2:  [82 %-100 %] 91 %    Hemodynamic parameters for last 24 hours       Respiratory Information for the last 24 hours       Physical Exam   Physical Exam  Vitals signs and nursing note reviewed.   Constitutional:       General: He is not in acute distress.     Appearance: He is not ill-appearing, toxic-appearing or diaphoretic.      Comments: Alert, oriented  Comfortable in bed  Not in distress   HENT:      Head: Normocephalic and atraumatic.      Mouth/Throat:      Mouth: Mucous membranes are moist.      Comments: ET tube in place  Neck:      Musculoskeletal: Neck supple.   Cardiovascular:      Rate  and Rhythm: Normal rate and regular rhythm.      Pulses: Normal pulses.      Heart sounds: Normal heart sounds. No murmur.   Pulmonary:      Effort: Pulmonary effort is normal. No respiratory distress.      Breath sounds: Normal breath sounds. No wheezing, rhonchi or rales.      Comments: Diminished breath sounds.   Abdominal:      General: There is no distension.      Palpations: Abdomen is soft.      Tenderness: There is no abdominal tenderness. There is no guarding.   Musculoskeletal:         General: No swelling or tenderness.      Right lower leg: No edema.      Left lower leg: No edema.   Skin:     General: Skin is warm.      Capillary Refill: Capillary refill takes less than 2 seconds.      Coloration: Skin is not jaundiced.   Neurological:      General: No focal deficit present.      Mental Status: He is alert and oriented to person, place, and time.      Cranial Nerves: No cranial nerve deficit.      Comments: Moving all extremities, no focal neuro deficit   Psychiatric:         Mood and Affect: Mood normal.         Behavior: Behavior normal.         Medications  Current Facility-Administered Medications   Medication Dose Route Frequency Provider Last Rate Last Dose   • amLODIPine (NORVASC) tablet 10 mg  10 mg Oral Q DAY Roberth Lorenzo D.O.   10 mg at 07/21/20 0528   • hydrALAZINE (APRESOLINE) injection 10-20 mg  10-20 mg Intravenous Q6HRS PRN Roberth Lorenzo D.O.   20 mg at 07/20/20 1200   • insulin regular (HumuLIN R,NovoLIN R) injection  3-14 Units Subcutaneous 4X/DAY ACHS Roberth Lorenzo D.O.   7 Units at 07/20/20 2019    And   • glucose 4 g chewable tablet 16 g  16 g Oral Q15 MIN PRN TRACIE Degroot.O.        And   • dextrose 50% (D50W) injection 50 mL  50 mL Intravenous Q15 MIN PRN Roberth Lorenzo D.O.       • insulin glargine (Lantus) injection  20 Units Subcutaneous Q EVENING TRACIE Degroot.O.       • labetalol (NORMODYNE/TRANDATE) injection 10 mg  10 mg Intravenous Q HOUR PRN Roberth Lorenzo D.O.       • atorvastatin  (LIPITOR) tablet 40 mg  40 mg Oral Q EVENING Roberth Lorenzo D.O.   40 mg at 07/20/20 1755   • metoprolol (LOPRESSOR) tablet 25 mg  25 mg Oral TWICE DAILY Roberth Lorenzo D.O.   25 mg at 07/21/20 0528   • levETIRAcetam (Keppra) 1000 mg in 100 mL NaCl IV premix  1,000 mg Intravenous Q12HRS Clay Vasquez M.D.   Stopped at 07/21/20 0542   • LORazepam (ATIVAN) injection 1-2 mg  1-2 mg Intravenous Q HOUR PRN Greyson Gregory M.D.       • senna-docusate (PERICOLACE or SENOKOT S) 8.6-50 MG per tablet 2 Tab  2 Tab Oral BID Greyson Gregory M.D.   Stopped at 07/19/20 0630    And   • polyethylene glycol/lytes (MIRALAX) PACKET 1 Packet  1 Packet Oral QDAY PRN Greyson Gregory M.D.        And   • magnesium hydroxide (MILK OF MAGNESIA) suspension 30 mL  30 mL Oral QDAY PRN Greyson Gregory M.D.        And   • bisacodyl (DULCOLAX) suppository 10 mg  10 mg Rectal QDAY PRN Greyson Gregory M.D.       • Respiratory Therapy Consult   Nebulization Continuous RT Greyson Gregory M.D.       • ipratropium-albuterol (DUONEB) nebulizer solution  3 mL Nebulization Q2HRS PRN (RT) Greyson Gregory M.D.       • MD Alert...ICU Electrolyte Replacement per Pharmacy   Other PHARMACY TO DOSE Greyson Gregory M.D.       • enoxaparin (LOVENOX) inj 40 mg  40 mg Subcutaneous DAILY Greyson Gregory M.D.   40 mg at 07/21/20 0528   • acetaminophen (TYLENOL) tablet 650 mg  650 mg Oral Q6HRS PRN Greyson Gregory M.D.   650 mg at 07/21/20 0531       Fluids    Intake/Output Summary (Last 24 hours) at 7/21/2020 0724  Last data filed at 7/21/2020 0600  Gross per 24 hour   Intake 2685.69 ml   Output 4900 ml   Net -2214.31 ml       Laboratory  Recent Labs     07/19/20  0520 07/20/20  0416   ISTATAPH 7.211* 7.430   ISTATAPCO2 47.5* 28.6   ISTATAPO2 397* 99*   ISTATATCO2 20 20   EXZIAND0YSE 100* 98   ISTATARTHCO3 19.0 18.9   ISTATARTBE -9* -4   ISTATTEMP 37.5 C 36.2 C   ISTATFIO2 100 30   ISTATSPEC Arterial Arterial   ISTATAPHTC 7.204* 7.442    KHHXCOFL6MJ 400* 95*     Recent Labs     07/19/20  0615   CPKTOTAL 125     Recent Labs     07/19/20  0820 07/19/20  1333 07/19/20  1720 07/20/20  0510   SODIUM 132* 138 138 138   POTASSIUM 4.3 3.7 3.3* 3.4*   CHLORIDE 95* 102 104 104   CO2 16* 19* 19* 18*   BUN 19 21 20 21   CREATININE 1.91* 2.39* 2.10* 2.41*   MAGNESIUM 2.2  --  2.1 2.2   PHOSPHORUS 2.6  --  1.8* 4.2   CALCIUM 7.7* 8.4* 7.8* 8.1*     Recent Labs     07/19/20  0403  07/19/20  0820 07/19/20  1333 07/19/20  1720 07/20/20  0510   ALTSGPT 24  --  17  --   --  16   ASTSGOT 14  --  12  --   --  21   ALKPHOSPHAT 90  --  65  --   --  64   TBILIRUBIN 0.8  --  0.7  --   --  0.5   GLUCOSE 484*   < > 538* 115* 136* 138*    < > = values in this interval not displayed.     Recent Labs     07/19/20  0403 07/19/20  0820 07/20/20  0510 07/20/20  1220 07/21/20  0545   WBC 18.8* 15.0* 9.6 13.8* 11.7*   NEUTSPOLYS 72.20* 81.90* 64.60 84.90* 73.90*   LYMPHOCYTES 20.00* 10.40* 25.50 9.00* 16.30*   MONOCYTES 5.20 6.90 7.70 4.90 7.70   EOSINOPHILS 1.70 0.00 1.40 0.30 1.30   BASOPHILS 0.00 0.10 0.40 0.40 0.30   ASTSGOT 14 12 21  --   --    ALTSGPT 24 17 16  --   --    ALKPHOSPHAT 90 65 64  --   --    TBILIRUBIN 0.8 0.7 0.5  --   --      Recent Labs     07/19/20  0403  07/20/20  0510 07/20/20  1220 07/21/20  0545   RBC 5.22   < > 3.99* 4.66* 4.86   HEMOGLOBIN 15.5   < > 11.8* 13.7* 14.2   HEMATOCRIT 48.5   < > 35.4* 40.2* 42.4   PLATELETCT 352   < > 191 203 227   PROTHROMBTM  --   --   --  13.6  --    APTT 22.6*  --   --   --   --    INR  --   --   --  1.01  --     < > = values in this interval not displayed.       Imaging  No new imaging    Microbiology:   7/19 SARS CoV2 PCR negative  7/19 UA no pyuria    Assessment/Plan  * New onset seizure (HCC)  Assessment & Plan  New onset seizure, recurrent with confused/agitated postictal state  No family history of seizures  Only prodrome for patient was significant anxiety related to seeing a physician in the clinic for the first  time in more than a decade  Loaded with Keppra in the ER  MRI brain without evidence of PRESS or any abnormalities  No further episodes of seizures since admission   7/20 EEG done and negative for seizures.     Plan:   Continue Keppra twice daily. Switch IV keppra to PO  Seizure precautions.   Neuro following      Essential hypertension  Assessment & Plan  BP was high in 240s at admission with HR in 120s. ECG with NSR  Post extubation in 180s  7/20 started on norvasc 10daily and metoprolol 25 BID    Plan:   Continue norvasc and metoprolol  Adjust as needed  Goal to keep -120s and HR <120s.   PRN antiHTN with labetalol or hydralazine   Check formal TTE      ANKIT (acute kidney injury) (HCC)  Assessment & Plan  Much improved with excellent UOP  Creatinine seems back to normal.   Discontinue broderick       Uncontrolled type 2 diabetes mellitus with hyperglycemia (HCC)  Assessment & Plan  Newly diagnosed. HA1C 14, hyperglycemia/DKA at time of admission  7/20 Transitioned to lantus 15units daily with ISS    Plan:   Continue optimize lantus to target goal BG. Increase lantus to 25 units daily with ISS  Goal -140  Diabetes education    Acute respiratory failure with hypoxia (HCC)  Assessment & Plan  Intubated primarily to protect airway  New onset seizure  Extubated 7/20      Lactic acidosis  Assessment & Plan  Secondary recurrent seizure   No clinical history to support sepsis syndrome  White count is elevated but suspect that is reactive      Leukocytosis  Assessment & Plan  Strongly suspect reactive only  Monitor-follow-up CBC and monitor fever curve  At risk for aspiration pneumonia with chest x-ray clear     I updated patient after extubation and girlfriend was also at bedside during my update. All questions were answered.     VTE:  Heparin  Ulcer: Not Indicated  Lines: None    I have performed a physical exam and reviewed and updated ROS and Plan today (7/21/2020). In review of yesterday's note (7/20/2020),  there are no changes except as documented above.     Discussed patient condition and risk of morbidity and/or mortality with Family, RN, RT, Charge nurse / hot rounds and Patient    Ok to leave ICU from my standpoint. Transfer to tele med.   D/w Dr. Laura POWER

## 2020-07-22 ENCOUNTER — PATIENT OUTREACH (OUTPATIENT)
Dept: HEALTH INFORMATION MANAGEMENT | Facility: OTHER | Age: 45
End: 2020-07-22

## 2020-07-22 VITALS
HEIGHT: 72 IN | OXYGEN SATURATION: 93 % | HEART RATE: 112 BPM | BODY MASS INDEX: 31.15 KG/M2 | WEIGHT: 230 LBS | SYSTOLIC BLOOD PRESSURE: 151 MMHG | RESPIRATION RATE: 19 BRPM | DIASTOLIC BLOOD PRESSURE: 92 MMHG | TEMPERATURE: 97.6 F

## 2020-07-22 PROBLEM — D72.829 LEUKOCYTOSIS: Status: RESOLVED | Noted: 2020-07-19 | Resolved: 2020-07-22

## 2020-07-22 PROBLEM — E87.20 LACTIC ACIDOSIS: Status: RESOLVED | Noted: 2020-07-19 | Resolved: 2020-07-22

## 2020-07-22 PROBLEM — N17.9 AKI (ACUTE KIDNEY INJURY) (HCC): Status: RESOLVED | Noted: 2020-07-19 | Resolved: 2020-07-22

## 2020-07-22 PROBLEM — J96.01 ACUTE RESPIRATORY FAILURE WITH HYPOXIA (HCC): Status: RESOLVED | Noted: 2020-07-19 | Resolved: 2020-07-22

## 2020-07-22 PROBLEM — F41.9 CHRONIC ANXIETY: Status: RESOLVED | Noted: 2020-07-19 | Resolved: 2020-07-22

## 2020-07-22 LAB
ANION GAP SERPL CALC-SCNC: 15 MMOL/L (ref 7–16)
BASOPHILS # BLD AUTO: 0.4 % (ref 0–1.8)
BASOPHILS # BLD: 0.04 K/UL (ref 0–0.12)
BUN SERPL-MCNC: 12 MG/DL (ref 8–22)
CALCIUM SERPL-MCNC: 9.1 MG/DL (ref 8.5–10.5)
CHLORIDE SERPL-SCNC: 101 MMOL/L (ref 96–112)
CO2 SERPL-SCNC: 20 MMOL/L (ref 20–33)
CREAT SERPL-MCNC: 1.15 MG/DL (ref 0.5–1.4)
EOSINOPHIL # BLD AUTO: 0.26 K/UL (ref 0–0.51)
EOSINOPHIL NFR BLD: 2.6 % (ref 0–6.9)
ERYTHROCYTE [DISTWIDTH] IN BLOOD BY AUTOMATED COUNT: 44 FL (ref 35.9–50)
GLUCOSE BLD-MCNC: 166 MG/DL (ref 65–99)
GLUCOSE BLD-MCNC: 208 MG/DL (ref 65–99)
GLUCOSE SERPL-MCNC: 194 MG/DL (ref 65–99)
HCT VFR BLD AUTO: 45.4 % (ref 42–52)
HGB BLD-MCNC: 14.8 G/DL (ref 14–18)
IMM GRANULOCYTES # BLD AUTO: 0.04 K/UL (ref 0–0.11)
IMM GRANULOCYTES NFR BLD AUTO: 0.4 % (ref 0–0.9)
LYMPHOCYTES # BLD AUTO: 2.48 K/UL (ref 1–4.8)
LYMPHOCYTES NFR BLD: 24.8 % (ref 22–41)
MAGNESIUM SERPL-MCNC: 1.9 MG/DL (ref 1.5–2.5)
MCH RBC QN AUTO: 29.5 PG (ref 27–33)
MCHC RBC AUTO-ENTMCNC: 32.6 G/DL (ref 33.7–35.3)
MCV RBC AUTO: 90.4 FL (ref 81.4–97.8)
MONOCYTES # BLD AUTO: 0.74 K/UL (ref 0–0.85)
MONOCYTES NFR BLD AUTO: 7.4 % (ref 0–13.4)
NEUTROPHILS # BLD AUTO: 6.45 K/UL (ref 1.82–7.42)
NEUTROPHILS NFR BLD: 64.4 % (ref 44–72)
NRBC # BLD AUTO: 0 K/UL
NRBC BLD-RTO: 0 /100 WBC
PHOSPHATE SERPL-MCNC: 3.4 MG/DL (ref 2.5–4.5)
PLATELET # BLD AUTO: 244 K/UL (ref 164–446)
PMV BLD AUTO: 9.8 FL (ref 9–12.9)
POTASSIUM SERPL-SCNC: 3.8 MMOL/L (ref 3.6–5.5)
RBC # BLD AUTO: 5.02 M/UL (ref 4.7–6.1)
SODIUM SERPL-SCNC: 136 MMOL/L (ref 135–145)
WBC # BLD AUTO: 10 K/UL (ref 4.8–10.8)

## 2020-07-22 PROCEDURE — 700111 HCHG RX REV CODE 636 W/ 250 OVERRIDE (IP): Performed by: INTERNAL MEDICINE

## 2020-07-22 PROCEDURE — 80048 BASIC METABOLIC PNL TOTAL CA: CPT

## 2020-07-22 PROCEDURE — 82962 GLUCOSE BLOOD TEST: CPT | Mod: 91

## 2020-07-22 PROCEDURE — 85025 COMPLETE CBC W/AUTO DIFF WBC: CPT

## 2020-07-22 PROCEDURE — A9270 NON-COVERED ITEM OR SERVICE: HCPCS | Performed by: INTERNAL MEDICINE

## 2020-07-22 PROCEDURE — 84100 ASSAY OF PHOSPHORUS: CPT

## 2020-07-22 PROCEDURE — 99239 HOSP IP/OBS DSCHRG MGMT >30: CPT | Performed by: HOSPITALIST

## 2020-07-22 PROCEDURE — 700102 HCHG RX REV CODE 250 W/ 637 OVERRIDE(OP): Performed by: INTERNAL MEDICINE

## 2020-07-22 PROCEDURE — 83735 ASSAY OF MAGNESIUM: CPT

## 2020-07-22 RX ORDER — ATORVASTATIN CALCIUM 40 MG/1
40 TABLET, FILM COATED ORAL EVERY EVENING
Qty: 30 TAB | Refills: 0 | Status: SHIPPED | OUTPATIENT
Start: 2020-07-22 | End: 2021-11-29 | Stop reason: SDUPTHER

## 2020-07-22 RX ORDER — ATORVASTATIN CALCIUM 40 MG/1
40 TABLET, FILM COATED ORAL EVERY EVENING
Qty: 30 TAB | Refills: 0 | Status: SHIPPED
Start: 2020-07-22 | End: 2020-07-22 | Stop reason: SDUPTHER

## 2020-07-22 RX ORDER — HYDROCHLOROTHIAZIDE 25 MG/1
25 TABLET ORAL DAILY
Qty: 30 TAB | Refills: 0 | Status: SHIPPED
Start: 2020-07-22 | End: 2020-07-22 | Stop reason: SDUPTHER

## 2020-07-22 RX ORDER — AMLODIPINE BESYLATE 10 MG/1
10 TABLET ORAL DAILY
Qty: 30 TAB | Refills: 0 | Status: SHIPPED | OUTPATIENT
Start: 2020-07-23 | End: 2021-11-29 | Stop reason: SDUPTHER

## 2020-07-22 RX ORDER — AMLODIPINE BESYLATE 10 MG/1
10 TABLET ORAL DAILY
Qty: 30 TAB | Refills: 0 | Status: SHIPPED
Start: 2020-07-23 | End: 2020-07-22 | Stop reason: SDUPTHER

## 2020-07-22 RX ORDER — LEVETIRACETAM 1000 MG/1
1000 TABLET ORAL 2 TIMES DAILY
Qty: 60 TAB | Refills: 0 | Status: SHIPPED
Start: 2020-07-22 | End: 2020-07-22 | Stop reason: SDUPTHER

## 2020-07-22 RX ORDER — HYDROCHLOROTHIAZIDE 25 MG/1
25 TABLET ORAL DAILY
Qty: 30 TAB | Refills: 0 | Status: SHIPPED | OUTPATIENT
Start: 2020-07-22 | End: 2021-11-11

## 2020-07-22 RX ORDER — AMLODIPINE BESYLATE 10 MG/1
10 TABLET ORAL DAILY
Qty: 30 TAB | Refills: 0 | Status: SHIPPED | OUTPATIENT
Start: 2020-07-23 | End: 2020-07-22 | Stop reason: SDUPTHER

## 2020-07-22 RX ORDER — LEVETIRACETAM 1000 MG/1
1000 TABLET ORAL 2 TIMES DAILY
Qty: 60 TAB | Refills: 0 | Status: SHIPPED | OUTPATIENT
Start: 2020-07-22 | End: 2020-11-18 | Stop reason: SDUPTHER

## 2020-07-22 RX ADMIN — LEVETIRACETAM 1000 MG: 500 TABLET ORAL at 04:32

## 2020-07-22 RX ADMIN — INSULIN HUMAN 4 UNITS: 100 INJECTION, SOLUTION PARENTERAL at 11:59

## 2020-07-22 RX ADMIN — INSULIN HUMAN 3 UNITS: 100 INJECTION, SOLUTION PARENTERAL at 07:51

## 2020-07-22 RX ADMIN — HYDRALAZINE HYDROCHLORIDE 10 MG: 20 INJECTION INTRAMUSCULAR; INTRAVENOUS at 00:14

## 2020-07-22 RX ADMIN — DOCUSATE SODIUM 50 MG AND SENNOSIDES 8.6 MG 2 TABLET: 8.6; 5 TABLET, FILM COATED ORAL at 04:32

## 2020-07-22 RX ADMIN — ENOXAPARIN SODIUM 40 MG: 40 INJECTION SUBCUTANEOUS at 04:33

## 2020-07-22 RX ADMIN — AMLODIPINE BESYLATE 10 MG: 10 TABLET ORAL at 04:32

## 2020-07-22 RX ADMIN — METOPROLOL TARTRATE 25 MG: 25 TABLET, FILM COATED ORAL at 04:33

## 2020-07-22 NOTE — CONSULTS
Diabetes Nurse Specialist:  Patient with new diagnosis of type 2 diabetes.  Patient states he has not had medical care for several years.   Patient family purchased a [employer] meter and had it at bedside.  Patient instructed on use of meter.   Discussed dietary changes, foods with carbohydrates and limiting portions.   Discussed exercise: patient states he is usually pretty active, encouraged 30 minutes of some type of exercise daily.    Patient prescribed Metformin prior to admission, discussed action of medication, when to take and possible side effects.   Written information provided to back up verbal instruction.   Suggest discharge home on Metformin 500 mg bid.

## 2020-07-22 NOTE — DISCHARGE SUMMARY
Discharge Summary    CHIEF COMPLAINT ON ADMISSION  Chief Complaint   Patient presents with   • ALOC       Reason for Admission  EMS     Admission Date  7/19/2020    CODE STATUS  Prior    HPI & HOSPITAL COURSE  For full details of admission please see the consultation of Dr. Greyson Billingsley MD, pulmonary/intensive care medicine, briefly, 44 y.o. male history of hypertension, prediabetes and anxiety who presented 7/19/2020 with new onset seizure and agitation.  Patient had some bad prior experiences with his medical issues and stopped seeing physicians more than a decade ago.  At that time he had known hypertension.  Strong family history of diabetes.  Today he was seen for the first time in the clinic because he was developing worsening vision issues and there was concern of diabetes on his and the family's part.  He was sent home on metformin which she did not start.  He was told he still have hypertension.  He was quite anxious about medications the clinic visit, blood draws etc. and subsequently his wife found him in the bedroom snoring somnolent not arousable in bed with some blood-tinged secretions from his nose or mouth.  Paramedics responded and during the process of assessing him he sounded post ictal from the wife's description, they thought he would be able to ambulate but he was not super conversive.  He then had a witnessed tonic-clonic seizure and was brought in emergently.  He was agitated on arrival here and received Versed and clinically actually worsened and required intubation mechanical ventilation for safety.  CT scan head was obtained after loading with Keppra and it was negative for any acute process.  He sedate now on the ventilator in the emergency room.  History was taken from the wife at the bedside.  She denied any neurologic signs or symptoms, she also denied anything that might suggest infection such as fever or chills.  No recent head trauma or any other seizure-like activity.  No  family history of seizures.  Patient's not any medicines and has no exposures and does not do any street drugs.  He was essentially normal except for anxiety.    Admitted to ICU, intubated, extubated, deemed stable for floor 7/21.  Insulin ggt weaned to SQ regimine as delinated below.   Patient was seen by the diabetic educator, they did feel that he would be sufficiently maintained on an oral regimen of oral metformin.  Patient was seen by neurology who recommended Keppra going forward and outpatient follow-up, referral placed by them.  Patient was extensively counseled on the importance of maintaining adherence with his medication regimine to ensure that the above events do not happen again.    Therefore, he is discharged in good and stable condition to home with close outpatient follow-up.    The patient met 2-midnight criteria for an inpatient stay at the time of discharge.    Discharge Date  7/22/2020    FOLLOW UP ITEMS POST DISCHARGE  PCP within 2 weeks.     DISCHARGE DIAGNOSES  Principal Problem:    New onset seizure (HCC) POA: Unknown  Active Problems:    Uncontrolled type 2 diabetes mellitus with hyperglycemia (HCC) POA: Unknown    Essential hypertension POA: Unknown    Prolonged Q-T interval on ECG POA: Unknown    Class 1 obesity due to excess calories without serious comorbidity with body mass index (BMI) of 31.0 to 31.9 in adult POA: Unknown  Resolved Problems:    Acute respiratory failure with hypoxia (HCC) POA: Unknown    ANKIT (acute kidney injury) (HCC) POA: Unknown    Lactic acidosis POA: Unknown    Leukocytosis POA: Unknown    Chronic anxiety??? POA: Unknown      FOLLOW UP  No future appointments.  [address] [address] [phone]  Schedule an appointment as soon as possible for a visit  Please call to establish with a Primary Care Physicain and schedule your hospital follow up. Thank you.      MEDICATIONS ON DISCHARGE     Medication List       START taking these medications      Instructions   amLODIPine 10 MG Tabs  Start taking on:  July 23, 2020  Commonly known as:  NORVASC   Take 1 Tab by mouth every day.  Dose:  10 mg     atorvastatin 40 MG Tabs  Commonly known as:  LIPITOR   Take 1 Tab by mouth every evening.  Dose:  40 mg     hydroCHLOROthiazide 25 MG Tabs  Commonly known as:  HYDRODIURIL   Take 1 Tab by mouth every day.  Dose:  25 mg     levetiracetam 1000 MG tablet  Commonly known as:  KEPPRA   Take 1 Tab by mouth 2 Times a Day.  Dose:  1,000 mg     metFORMIN 850 MG Tabs  Commonly known as:  GLUCOPHAGE   Take 1 Tab by mouth 2 times a day, with meals.  Dose:  850 mg     metoprolol 25 MG Tabs  Commonly known as:  LOPRESSOR   Take 1 Tab by mouth 2 Times a Day.  Dose:  25 mg            Allergies  No Known Allergies    DIET  No orders of the defined types were placed in this encounter.      ACTIVITY  As tolerated.  Weight bearing as tolerated    CONSULTATIONS  Dr. Gregory, pulmonary/intensive care medicine  Dr. Poncho Queen, neurology    RADIOLOGY  EC-ECHOCARDIOGRAM COMPLETE W/ CONT   Final Result      DX-CHEST-PORTABLE (1 VIEW)   Final Result         1.  No acute cardiopulmonary disease.   2.  Cardiomegaly      MR-BRAIN-WITH & W/O   Final Result      1.  There is no evidence of mesial temporal sclerosis.   2.  There is no evidence of cortical dysplasia or neuronal migrational abnormality.   3.  A few nonspecific T2 hyperintensities in the supratentorial white matter likely representing nonspecific foci of gliosis/chronic ischemia.      CT-HEAD W/O   Final Result      Head CT without contrast within normal limits. No evidence of acute cerebral infarction, hemorrhage or mass lesion.      DX-ABDOMEN FOR TUBE PLACEMENT   Final Result      Enteric tube tip projects over the stomach      DX-CHEST-PORTABLE (1 VIEW)   Final Result      1.  No acute cardiac or pulmonary abnormalities are identified. Lung volumes are low.   2.  Line and tube position  as described above            LABORATORY  Lab Results   Component Value Date    SODIUM 136 07/22/2020    POTASSIUM 3.8 07/22/2020    CHLORIDE 101 07/22/2020    CO2 20 07/22/2020    GLUCOSE 194 (H) 07/22/2020    BUN 12 07/22/2020    CREATININE 1.15 07/22/2020        Lab Results   Component Value Date    WBC 10.0 07/22/2020    HEMOGLOBIN 14.8 07/22/2020    HEMATOCRIT 45.4 07/22/2020    PLATELETCT 244 07/22/2020        Total time of the discharge process exceeds 32 minutes.

## 2020-07-22 NOTE — PROGRESS NOTES
Patient discharged, discharge information given to patient and significant other Davina over the phone. Went over figersticks and diabetic education with patient and Davina. Patient demonstrated understanding and gave verbal read back. Patient and Davina feel comfortable discharging.All belongings sent home with patient.

## 2020-07-22 NOTE — DISCHARGE PLANNING
Anticipated Discharge Disposition: home    Action: received call from Ruma at [facility]. Norvasc $7.50, Keppra $496.50, lopressor $7.50, lipitor $7.50, metformin $20.25, hydrodiuril $7.50. spoke w/ SO Davina and pt at bedside. Pt unable to afford dc meds. Pt advised by [facility] to fill dc meds at [facility] with Good Rx coupons. Pt and SO Davina agreeable. Advised Davina call Medicaid and apply for Medicaid via phone    Barriers to Discharge: none    Plan: home today

## 2020-07-22 NOTE — DISCHARGE INSTRUCTIONS
Discharge Instructions    Discharged to home by car with relative. Discharged via wheelchair, hospital escort: Yes.  Special equipment needed: Not Applicable     Be sure to schedule a follow-up appointment with your primary care doctor or any specialists as instructed.     Discharge Plan:   Diet Plan: Discussed  Activity Level: Discussed  Confirmed Follow up Appointment: Appointment Scheduled  Confirmed Symptoms Management: Discussed  Medication Reconciliation Updated: Yes    I understand that a diet low in cholesterol, fat, and sodium is recommended for good health. Unless I have been given specific instructions below for another diet, I accept this instruction as my diet prescription.   Other diet: Diabetic     Special Instructions: None    · Is patient discharged on Warfarin / Coumadin?   No     Depression / Suicide Risk    As you are discharged from this [facility] facility, it is important to learn how to keep safe from harming yourself.    Recognize the warning signs:  · Abrupt changes in personality, positive or negative- including increase in energy   · Giving away possessions  · Change in eating patterns- significant weight changes-  positive or negative  · Change in sleeping patterns- unable to sleep or sleeping all the time   · Unwillingness or inability to communicate  · Depression  · Unusual sadness, discouragement and loneliness  · Talk of wanting to die  · Neglect of personal appearance   · Rebelliousness- reckless behavior  · Withdrawal from people/activities they love  · Confusion- inability to concentrate     If you or a loved one observes any of these behaviors or has concerns about self-harm, here's what you can do:  · Talk about it- your feelings and reasons for harming yourself  · Remove any means that you might use to hurt yourself (examples: pills, rope, extension cords, firearm)  · Get professional help from the community (Mental Health, Substance Abuse, psychological counseling)  · Do  not be alone:Call your Safe Contact- someone whom you trust who will be there for you.  · Call your local CRISIS HOTLINE 331-8046 or 744-364-8743  · Call your local Children's Mobile Crisis Response Team [place] (191) 696-9359 or www.MedNews  · Call the toll free National Suicide Prevention Hotlines   · National Suicide Prevention Lifeline 576-233-TWBX (0435)  · National Hope Line Network 800-SUICIDE (867-9297)      Diabetes Mellitus and Nutrition, Adult  When you have diabetes (diabetes mellitus), it is very important to have healthy eating habits because your blood sugar (glucose) levels are greatly affected by what you eat and drink. Eating healthy foods in the appropriate amounts, at about the same times every day, can help you:  · Control your blood glucose.  · Lower your risk of heart disease.  · Improve your blood pressure.  · Reach or maintain a healthy weight.  Every person with diabetes is different, and each person has different needs for a meal plan. Your health care provider may recommend that you work with a diet and nutrition specialist (dietitian) to make a meal plan that is best for you. Your meal plan may vary depending on factors such as:  · The calories you need.  · The medicines you take.  · Your weight.  · Your blood glucose, blood pressure, and cholesterol levels.  · Your activity level.  · Other health conditions you have, such as heart or kidney disease.  How do carbohydrates affect me?  Carbohydrates, also called carbs, affect your blood glucose level more than any other type of food. Eating carbs naturally raises the amount of glucose in your blood. Carb counting is a method for keeping track of how many carbs you eat. Counting carbs is important to keep your blood glucose at a healthy level, especially if you use insulin or take certain oral diabetes medicines.  It is important to know how many carbs you can safely have in each meal. This is different for every person. Your  "dietitian can help you calculate how many carbs you should have at each meal and for each snack.  Foods that contain carbs include:  · Bread, cereal, rice, pasta, and crackers.  · Potatoes and corn.  · Peas, beans, and lentils.  · Milk and yogurt.  · Fruit and juice.  · Desserts, such as cakes, cookies, ice cream, and candy.  How does alcohol affect me?  Alcohol can cause a sudden decrease in blood glucose (hypoglycemia), especially if you use insulin or take certain oral diabetes medicines. Hypoglycemia can be a life-threatening condition. Symptoms of hypoglycemia (sleepiness, dizziness, and confusion) are similar to symptoms of having too much alcohol.  If your health care provider says that alcohol is safe for you, follow these guidelines:  · Limit alcohol intake to no more than 1 drink per day for nonpregnant women and 2 drinks per day for men. One drink equals 12 oz of beer, 5 oz of wine, or 1½ oz of hard liquor.  · Do not drink on an empty stomach.  · Keep yourself hydrated with water, diet soda, or unsweetened iced tea.  · Keep in mind that regular soda, juice, and other mixers may contain a lot of sugar and must be counted as carbs.  What are tips for following this plan?    Reading food labels  · Start by checking the serving size on the \"Nutrition Facts\" label of packaged foods and drinks. The amount of calories, carbs, fats, and other nutrients listed on the label is based on one serving of the item. Many items contain more than one serving per package.  · Check the total grams (g) of carbs in one serving. You can calculate the number of servings of carbs in one serving by dividing the total carbs by 15. For example, if a food has 30 g of total carbs, it would be equal to 2 servings of carbs.  · Check the number of grams (g) of saturated and trans fats in one serving. Choose foods that have low or no amount of these fats.  · Check the number of milligrams (mg) of salt (sodium) in one serving. Most people " "should limit total sodium intake to less than 2,300 mg per day.  · Always check the nutrition information of foods labeled as \"low-fat\" or \"nonfat\". These foods may be higher in added sugar or refined carbs and should be avoided.  · Talk to your dietitian to identify your daily goals for nutrients listed on the label.  Shopping  · Avoid buying canned, premade, or processed foods. These foods tend to be high in fat, sodium, and added sugar.  · Shop around the outside edge of the grocery store. This includes fresh fruits and vegetables, bulk grains, fresh meats, and fresh dairy.  Cooking  · Use low-heat cooking methods, such as baking, instead of high-heat cooking methods like deep frying.  · Cook using healthy oils, such as olive, canola, or sunflower oil.  · Avoid cooking with butter, cream, or high-fat meats.  Meal planning  · Eat meals and snacks regularly, preferably at the same times every day. Avoid going long periods of time without eating.  · Eat foods high in fiber, such as fresh fruits, vegetables, beans, and whole grains. Talk to your dietitian about how many servings of carbs you can eat at each meal.  · Eat 4-6 ounces (oz) of lean protein each day, such as lean meat, chicken, fish, eggs, or tofu. One oz of lean protein is equal to:  ? 1 oz of meat, chicken, or fish.  ? 1 egg.  ? ¼ cup of tofu.  · Eat some foods each day that contain healthy fats, such as avocado, nuts, seeds, and fish.  Lifestyle  · Check your blood glucose regularly.  · Exercise regularly as told by your health care provider. This may include:  ? 150 minutes of moderate-intensity or vigorous-intensity exercise each week. This could be brisk walking, biking, or water aerobics.  ? Stretching and doing strength exercises, such as yoga or weightlifting, at least 2 times a week.  · Take medicines as told by your health care provider.  · Do not use any products that contain nicotine or tobacco, such as cigarettes and e-cigarettes. If you need " help quitting, ask your health care provider.  · Work with a counselor or diabetes educator to identify strategies to manage stress and any emotional and social challenges.  Questions to ask a health care provider  · Do I need to meet with a diabetes educator?  · Do I need to meet with a dietitian?  · What number can I call if I have questions?  · When are the best times to check my blood glucose?  Where to find more information:  · American Diabetes Association: diabetes.org  · Academy of Nutrition and Dietetics: www.eatright.org  · National Tower of Diabetes and Digestive and Kidney Diseases (NIH): www.niddk.nih.gov  Summary  · A healthy meal plan will help you control your blood glucose and maintain a healthy lifestyle.  · Working with a diet and nutrition specialist (dietitian) can help you make a meal plan that is best for you.  · Keep in mind that carbohydrates (carbs) and alcohol have immediate effects on your blood glucose levels. It is important to count carbs and to use alcohol carefully.  This information is not intended to replace advice given to you by your health care provider. Make sure you discuss any questions you have with your health care provider.  Document Released: 09/14/2006 Document Revised: 11/30/2018 Document Reviewed: 01/22/2018  [employer] Patient Education © 2020 [employer]        Hyperglycemia  Hyperglycemia is when the sugar (glucose) level in your blood is too high. It may not cause symptoms. If you do have symptoms, they may include warning signs, such as:  · Feeling more thirsty than normal.  · Hunger.  · Feeling tired.  · Needing to pee (urinate) more than normal.  · Blurry eyesight (vision).  You may get other symptoms as it gets worse, such as:  · Dry mouth.  · Not being hungry (loss of appetite).  · Fruity-smelling breath.  · Weakness.  · Weight gain or loss that is not planned. Weight loss may be fast.  · A tingling or numb feeling in your hands or  feet.  · Headache.  · Skin that does not bounce back quickly when it is lightly pinched and released (poor skin turgor).  · Pain in your belly (abdomen).  · Cuts or bruises that heal slowly.  High blood sugar can happen to people who do or do not have diabetes. High blood sugar can happen slowly or quickly, and it can be an emergency.  Follow these instructions at home:  General instructions  · Take over-the-counter and prescription medicines only as told by your doctor.  · Do not use products that contain nicotine or tobacco, such as cigarettes and e-cigarettes. If you need help quitting, ask your doctor.  · Limit alcohol intake to no more than 1 drink per day for nonpregnant women and 2 drinks per day for men. One drink equals 12 oz of beer, 5 oz of wine, or 1½ oz of hard liquor.  · Manage stress. If you need help with this, ask your doctor.  · Keep all follow-up visits as told by your doctor. This is important.  Eating and drinking    · Stay at a healthy weight.  · Exercise regularly, as told by your doctor.  · Drink enough fluid, especially when you:  ? Exercise.  ? Get sick.  ? Are in hot temperatures.  · Eat healthy foods, such as:  ? Low-fat (lean) proteins.  ? Complex carbs (complex carbohydrates), such as whole wheat bread or brown rice.  ? Fresh fruits and vegetables.  ? Low-fat dairy products.  ? Healthy fats.  · Drink enough fluid to keep your pee (urine) clear or pale yellow.  If you have diabetes:    · Make sure you know the symptoms of hyperglycemia.  · Follow your diabetes management plan, as told by your doctor. Make sure you:  ? Take insulin and medicines as told.  ? Follow your exercise plan.  ? Follow your meal plan. Eat on time. Do not skip meals.  ? Check your blood sugar as often as told. Make sure to check before and after exercise. If you exercise longer or in a different way than you normally do, check your blood sugar more often.  ? Follow your sick day plan whenever you cannot eat or  drink normally. Make this plan ahead of time with your doctor.  · Share your diabetes management plan with people in your workplace, school, and household.  · Check your urine for ketones when you are ill and as told by your doctor.  · Carry a card or wear jewelry that says that you have diabetes.  Contact a doctor if:  · Your blood sugar level is higher than 240 mg/dL (13.3 mmol/L) for 2 days in a row.  · You have problems keeping your blood sugar in your target range.  · High blood sugar happens often for you.  Get help right away if:  · You have trouble breathing.  · You have a change in how you think, feel, or act (mental status).  · You feel sick to your stomach (nauseous), and that feeling does not go away.  · You cannot stop throwing up (vomiting).  These symptoms may be an emergency. Do not wait to see if the symptoms will go away. Get medical help right away. Call your local emergency services (911 in the [place] Do not drive yourself to the hospital.  Summary  · Hyperglycemia is when the sugar (glucose) level in your blood is too high.  · High blood sugar can happen to people who do or do not have diabetes.  · Make sure you drink enough fluids, eat healthy foods, and exercise regularly.  · Contact your doctor if you have problems keeping your blood sugar in your target range.  This information is not intended to replace advice given to you by your health care provider. Make sure you discuss any questions you have with your health care provider.  Document Released: 10/15/2010 Document Revised: 09/04/2017 Document Reviewed: 09/04/2017  [employer]vier Patient Education © 2020 [employer]    Hypoglycemia  Hypoglycemia is when the sugar (glucose) level in your blood is too low. Signs of low blood sugar may include:  · Feeling:  ? Hungry.  ? Worried or nervous (anxious).  ? Sweaty and clammy.  ? Confused.  ? Dizzy.  ? Sleepy.  ? Sick to your stomach (nauseous).  · Having:  ? A fast heartbeat.  ? A headache.  ? A  change in your vision.  ? Tingling or no feeling (numbness) around your mouth, lips, or tongue.  ? Jerky movements that you cannot control (seizure).  · Having trouble with:  ? Moving (coordination).  ? Sleeping.  ? Passing out (fainting).  ? Getting upset easily (irritability).  Low blood sugar can happen to people who have diabetes and people who do not have diabetes. Low blood sugar can happen quickly, and it can be an emergency.  Treating low blood sugar  Low blood sugar is often treated by eating or drinking something sugary right away, such as:  · Fruit juice, 4-6 oz (120-150 mL).  · Regular soda (not diet soda), 4-6 oz (120-150 mL).  · Low-fat milk, 4 oz (120 mL).  · Several pieces of hard candy.  · Sugar or honey, 1 Tbsp (15 mL).  Treating low blood sugar if you have diabetes  If you can think clearly and swallow safely, follow the 15:15 rule:  · Take 15 grams of a fast-acting carb (carbohydrate). Talk with your doctor about how much you should take.  · Always keep a source of fast-acting carb with you, such as:  ? Sugar tablets (glucose pills). Take 3-4 pills.  ? 6-8 pieces of hard candy.  ? 4-6 oz (120-150 mL) of fruit juice.  ? 4-6 oz (120-150 mL) of regular (not diet) soda.  ? 1 Tbsp (15 mL) honey or sugar.  · Check your blood sugar 15 minutes after you take the carb.  · If your blood sugar is still at or below 70 mg/dL (3.9 mmol/L), take 15 grams of a carb again.  · If your blood sugar does not go above 70 mg/dL (3.9 mmol/L) after 3 tries, get help right away.  · After your blood sugar goes back to normal, eat a meal or a snack within 1 hour.    Treating very low blood sugar  If your blood sugar is at or below 54 mg/dL (3 mmol/L), you have very low blood sugar (severe hypoglycemia). This may also cause:  · Passing out.  · Jerky movements you cannot control (seizure).  · Losing consciousness (coma).  This is an emergency. Do not wait to see if the symptoms will go away. Get medical help right away. Call  your local emergency services (911 in the [place] Do not drive yourself to the hospital.  If you have very low blood sugar and you cannot eat or drink, you may need a glucagon shot (injection). A family member or friend should learn how to check your blood sugar and how to give you a glucagon shot. Ask your doctor if you need to have a glucagon shot kit at home.  Follow these instructions at home:  General instructions  · Take over-the-counter and prescription medicines only as told by your doctor.  · Stay aware of your blood sugar as told by your doctor.  · Limit alcohol intake to no more than 1 drink a day for nonpregnant women and 2 drinks a day for men. One drink equals 12 oz of beer (355 mL), 5 oz of wine (148 mL), or 1½ oz of hard liquor (44 mL).  · Keep all follow-up visits as told by your doctor. This is important.  If you have diabetes:    · Follow your diabetes care plan as told by your doctor. Make sure you:  ? Know the signs of low blood sugar.  ? Take your medicines as told.  ? Follow your exercise and meal plan.  ? Eat on time. Do not skip meals.  ? Check your blood sugar as often as told by your doctor. Always check it before and after exercise.  ? Follow your sick day plan when you cannot eat or drink normally. Make this plan ahead of time with your doctor.  · Share your diabetes care plan with:  ? Your work or school.  ? People you live with.  · Check your pee (urine) for ketones:  ? When you are sick.  ? As told by your doctor.  · Carry a card or wear jewelry that says you have diabetes.  Contact a doctor if:  · You have trouble keeping your blood sugar in your target range.  · You have low blood sugar often.  Get help right away if:  · You still have symptoms after you eat or drink something sugary.  · Your blood sugar is at or below 54 mg/dL (3 mmol/L).  · You have jerky movements that you cannot control.  · You pass out.  These symptoms may be an emergency. Do not wait to see if the symptoms will  go away. Get medical help right away. Call your local emergency services (911 in the [place] Do not drive yourself to the hospital.  Summary  · Hypoglycemia happens when the level of sugar (glucose) in your blood is too low.  · Low blood sugar can happen to people who have diabetes and people who do not have diabetes. Low blood sugar can happen quickly, and it can be an emergency.  · Make sure you know the signs of low blood sugar and know how to treat it.  · Always keep a source of sugar (fast-acting carb) with you to treat low blood sugar.  This information is not intended to replace advice given to you by your health care provider. Make sure you discuss any questions you have with your health care provider.  Document Released: 03/14/2011 Document Revised: 04/09/2020 Document Reviewed: 01/20/2017  [employer] Patient Education © 2020 [employer]

## 2020-07-23 LAB
BACTERIA UR CULT: NORMAL
SIGNIFICANT IND 70042: NORMAL
SITE SITE: NORMAL
SOURCE SOURCE: NORMAL

## 2020-11-16 NOTE — PROGRESS NOTES
Chief Complaint   Patient presents with   • New Patient     seizures       Problem List Items Addressed This Visit     None      Visit Diagnoses     Seizure-like activity (HCC)        Relevant Medications    levetiracetam (KEPPRA) 1000 MG tablet    Other Relevant Orders    REFERRAL TO NEURODIAGNOSTICS (EEG,EP,EMG/NCS/DBS)    KEPPRA    Abnormal EEG        Relevant Medications    levetiracetam (KEPPRA) 1000 MG tablet    Other Relevant Orders    REFERRAL TO NEURODIAGNOSTICS (EEG,EP,EMG/NCS/DBS)    KEPPRA    Encounter for examination for driving license        Screening for depression        Hospital discharge follow-up        Vitamin D deficiency        Relevant Orders    VITAMIN D,25 HYDROXY          History of present illness:  Dung José 45 y.o. male presents today with Davina, significant other, for seizure evaluation.     Pt reports he started having vision problems in June 2020 and had a hunch that he has diabetes. Then  A couple weeks after, he was diagnosed with DMII and HTN. Pt had witnessed seizure activities in July 2020 and was seen in the hospital for this. He reports he was very sick then that he ended up in the ICU intubated and Davina reports he kidneys were failing already. They report that his other providers think that the seizure was likely related to metabolic abnormalities during his hospital stay. He was discharged on keppra 1000mg BID. No side effects. Compliant. He states he is working hard to reverse what happened in July and he is exercising. He states his repeat A1c was 5.9 in October. His SBP is normally good in the 120's. He has not had any more seizures.     No family hx of seizures. No TBI hx    No alcohol use. Not smoking cigarettes. No recreational drugs.     Mood is good. No suicidal or homicidal thoughts.     Not taking Vit D    He is not driving but wants to drive again.         Past medical history:   Past Medical History:   Diagnosis Date   • Diabetes (HCC)    • Hypertension         Past surgical history:   History reviewed. No pertinent surgical history.    Family history:   History reviewed. No pertinent family history.    Social history:   Social History     Socioeconomic History   • Marital status: Single     Spouse name: Not on file   • Number of children: Not on file   • Years of education: Not on file   • Highest education level: Not on file   Occupational History   • Not on file   Social Needs   • Financial resource strain: Not on file   • Food insecurity     Worry: Not on file     Inability: Not on file   • Transportation needs     Medical: Not on file     Non-medical: Not on file   Tobacco Use   • Smoking status: Not on file   Substance and Sexual Activity   • Alcohol use: Not on file   • Drug use: Not on file   • Sexual activity: Not on file   Lifestyle   • Physical activity     Days per week: Not on file     Minutes per session: Not on file   • Stress: Not on file   Relationships   • Social connections     Talks on phone: Not on file     Gets together: Not on file     Attends [employer] service: Not on file     Active member of club or organization: Not on file     Attends meetings of clubs or organizations: Not on file     Relationship status: Not on file   • Intimate partner violence     Fear of current or ex partner: Not on file     Emotionally abused: Not on file     Physically abused: Not on file     Forced sexual activity: Not on file   Other Topics Concern   • Not on file   Social History Narrative   • Not on file       Current medications:   Current Outpatient Medications   Medication   • atorvastatin (LIPITOR) 40 MG Tab   • levetiracetam (KEPPRA) 1000 MG tablet   • metoprolol (LOPRESSOR) 25 MG Tab   • hydroCHLOROthiazide (HYDRODIURIL) 25 MG Tab   • metFORMIN (GLUCOPHAGE) 850 MG Tab   • amLODIPine (NORVASC) 10 MG Tab     No current facility-administered medications for this visit.        Medication Allergy:  No Known Allergies      Review of systems:     General: Denies  "fevers or chills, or nightsweats, or generalized fatigue.    Head: Denies headaches or dizziness or lightheadedness  EENT: Denies vision changes, vision loss or pain, nasal secretion, nasal bleeding, difficulty swallowing, hearing loss, tinnitus, vertigo, ear pain  Respiratory: Denies shortness of breath, cough, sputum, or wheezing  Cardiac: Denies chest pain, palpitations, edema or syncope  Gastrointestinal: Denies nausea, vomiting, no abdominal pain or change in bowel habits, no melena or hematochezia  Urinary: Denies dysuria, frequency, hesitancy, or incontinence.  Dermatologic:  Denies new rash  Musculoskeletal: Denies muscle pain or swelling, no atrophy, no neck and back pain or stiffness.   Neurologic: Denies facial droopiness, muscle weakness (focal or generalized), paresthesias, ataxia, change in speech or language, memory loss, abnormal movements. + seizures, loss of consciousness  Psychiatric: Denies anxiety, depression, mood swings, suicidal or homicidal thoughts       Physical examination:   Vitals:    11/18/20 1427   BP: 145/90   BP Location: Right arm   Patient Position: Sitting   BP Cuff Size: Adult   Pulse: (!) 110   Resp: 16   Temp: 37.2 °C (99 °F)   TempSrc: Temporal   SpO2: 97%   Weight: 116 kg (255 lb 11.7 oz)   Height: 1.854 m (6' 1\")     General: Patient in no acute distress, pleasant and cooperative.  HEENT: Normocephalic, no signs of acute trauma.   moist conjunctivae. Nares are patent. Oropharynx clear without lesions and normal  hard and soft palates.   Neck: Supple. There is normal range of motion.   Resp: clear to auscultation bilaterally. No wheezes or crackles.   CV: RRR, no murmurs.   Skin: no signs of acute rashes or trauma.   Musculoskeletal: joints exhibit full range of motion  Psychiatric: No hallucinatory behavior. No symptoms of depression or suicidal ideation. Mood and affect appear normal on exam.     NEUROLOGICAL EXAM:   Mental status, orientation: Awake, alert and fully " oriented.   Speech and language: speech is clear and fluent. The patient is able to name, repeat and comprehend.   Memory: There is intact recollection of recent and remote events.   Cranial nerve exam:   CN I: Not examined   CN II: PERRL.   CN III, IV, VI: EOMI; no nystagmus   CN V: Facial sensation intact bilaterally   CN VII: face symmetric   CN VIII: hearing intact to finger rub bilaterally   CN IX, X: palate elevates symmetrically   CN XI: Symmetric shoulder shrug  CN XII: tongue midline. No signs of tongue biting or fasciculations   Motor exam: Strength is 5/5 in all extremities. Tone is normal. No abnormal movements were seen on exam.   Sensory exam reveals normal sense of light touch in all extremities.   Deep tendon reflexes:  2+ throughout. Plantar responses are flexor. There is no clonus.   Coordination: shows a normal finger-nose-finger. Normal rapidly alternating movements.   Gait: The patient was able to get up from seated position on first attempt without requiring assistance. Found to be steady when walking. Movements were fluid with normal arm swing. The patient was able to turn without difficulties or tendency to fall. Romberg exam unremarkable      ANCILLARY DATA REVIEWED:       Lab Data Review:  Reviewed in chart.    Records reviewed:   Reviewed in chart.    Imaging:   MRI brain w & w/o 7/19/2020  1.  There is no evidence of mesial temporal sclerosis.  2.  There is no evidence of cortical dysplasia or neuronal migrational abnormality.  3.  A few nonspecific T2 hyperintensities in the supratentorial white matter likely representing nonspecific foci of gliosis/chronic ischemia.    EEG:  Routine EEG 7/20/200  This is an abnormal routine EEG recording in the awake and drowsy state(s). Frequent runs of high amplitude frontal intermittent rhythmic delta activity (FIRDA) noted during the study.  The findings may increased risk for seizures, and suggest underlying structural abnormality, cortical  irritability, and or increased intracranial pressure.  No seizures captured during the study.  Clinical and radiological correlation is recommended.        ASSESSMENT AND PLAN:    1. Seizure-like activity (HCC)  - REFERRAL TO NEURODIAGNOSTICS (EEG,EP,EMG/NCS/DBS)  - levetiracetam (KEPPRA) 1000 MG tablet; Take 1 Tab by mouth 2 Times a Day.  Dispense: 60 Tab; Refill: 11  - KEPPRA; Future    2. Abnormal EEG  - REFERRAL TO NEURODIAGNOSTICS (EEG,EP,EMG/NCS/DBS)  - levetiracetam (KEPPRA) 1000 MG tablet; Take 1 Tab by mouth 2 Times a Day.  Dispense: 60 Tab; Refill: 11  - KEPPRA; Future    3. Encounter for examination for driving license    4. Screening for depression    5. Hospital discharge follow-up    6. Vitamin D deficiency  - VITAMIN D,25 HYDROXY; Future          CLINICAL DISCUSSION:  Possible focal seizure likely provoked by metabolic abnormalities when he was critically ill in July 2020. HgA1c then was 14.6. His EEG was abnormal so he was started on keppra 1000mg BID. His MRI brain noted nonspecific white matter abnormalities likely related to his vascular risk factors. No more seizures since. He has been compliant and has no side effects on keppra. He made drastic changes to his lifestyle and he feels better now.     No family hx of seizures. No TBI hx    He does not drink alcohol. Not smoking cigarettes. No recreational drug use.     Mood is good. No suicidal or homicidal thoughts.     Past ASM's:none    Current ASM's: keppra 1000mg BID      Plan:  - 24hr EEG    -Pt wants to continue with keppra for now and he wants to be weaned off in the future.     - Discussed avoidance of spell/sz triggers: alcohol, sleep deprivation, energy drinks and stress.    - Discussed Vit D supplementation. Recommended taking 2000-5000u daily.    - Discussed driving restrictions. Okay to drive but aware to stop driving immediately if a spell occurs and report to us.  [employer] paperwork faxed and copy given to pt    -Labs to be checked for  next appointment: keppra, vit D          FOLLOW-UP:   Return in about 4 weeks (around 12/16/2020), or or after EEG.          EDUCATION AND COUNSELING:  -Education was provided to the patient and/or family regarding diagnosis and prognosis. The chronic and unpredictable nature of the condition were discussed. There is increased risk for additional events, which may carry potential for significant injuries and death. Discussed frequent seizure triggers: sleep deprivation, medication non-compliance, use of illegal drugs/alcohol, stress, and others.   -We reviewed in detail the current antiepileptic regimen. Potential side effects of antiepileptics were discussed at length, including but no limited to: hypersensitivity reactions (rash and others, some of which can be fatal), visual field changes (some of which may be irreversible), glaucoma, diplopia, kidney stones, osteopenia/osteoporosis/bone fractures, hyperthermia/anhydrosis, hyponatremia, tremors/abnormal movements, ataxia, dizziness, fatigue, increased risk for falls, risk for cardiac arrhythmias/syncope, gastrointestinal side effects(hepatitis, pancreatitis, gastritis, ulcers), gingival hypertrophy/bleeding, drowsiness, sedation, anxiety/nervousness, increased risk for suicide, increased risk for depression, and psychosis.   -We also reviewed drug-drug interactions and their potential effect on seizure control and medication side effects.    -Recommend chronic vitamin D supplementation and regular exercise (if not contraindicated).   -Patient/family educated on risk for SUDEP (Sudden Death in Epilepsy). Counseling was provided on the importance of strict medication and follow up compliance. The patient/family understand the risks associated with non-adherence with the medical plan as outlined, including but not limited to an increased risk for breakthrough seizures, which may contribute to injuries, disability, status epilepticus, and even death.   -Counseling  was also provided on potential effects of alcohol and other drugs, which may lower seizure threshold and/or affect the metabolism of antiepileptic drugs. We recommend avoidance of alcohol and illegal drugs.  -Avoid sleep deprivation.   -We extensively discussed the aspects related to safety in drivers who suffer from epilepsy. The patient is encourage to report to the Division of Motor Vehicles of any condition and/or spells related to confusion, disorientation, and/or loss of awareness and/or loss of consciousness; as these may pose a safety issue if they occur while operating a motor vehicle. The patient and/or family are ultimately responsible for exercising caution and abiding to regulations in place.   -Other seizure precautions were discussed at length, including no diving, no skydiving, no climbing or exposure to unprotected heights, no unsupervised swimming, no Jacuzzi or bathing in bathtubs or deep bodies of water. The patient/family have been advised about risks for operating any machinery while suffering from seizures / syncope / epilepsy and/or while taking antiepileptic drugs.   -The patient understands and agrees that due to the complexity of his/her diagnosis, results of any testing and further recommendations will typically be discussed/made during a face to face encounter in my office. The patient and/or family further understands it is their responsibility to keep proper follow up.     Patient/family agree with plan, as outlined.         Brittny Triplett, MSN, APRN, FNP-C  [facility]  Office: [phone]  Fax: [phone]

## 2020-11-18 ENCOUNTER — OFFICE VISIT (OUTPATIENT)
Dept: NEUROLOGY | Facility: MEDICAL CENTER | Age: 45
End: 2020-11-18

## 2020-11-18 VITALS
DIASTOLIC BLOOD PRESSURE: 90 MMHG | OXYGEN SATURATION: 97 % | WEIGHT: 255.73 LBS | RESPIRATION RATE: 16 BRPM | HEART RATE: 110 BPM | BODY MASS INDEX: 33.89 KG/M2 | HEIGHT: 73 IN | TEMPERATURE: 99 F | SYSTOLIC BLOOD PRESSURE: 145 MMHG

## 2020-11-18 DIAGNOSIS — Z02.4 ENCOUNTER FOR EXAMINATION FOR DRIVING LICENSE: ICD-10-CM

## 2020-11-18 DIAGNOSIS — Z13.31 SCREENING FOR DEPRESSION: ICD-10-CM

## 2020-11-18 DIAGNOSIS — E66.9 OBESITY (BMI 30-39.9): ICD-10-CM

## 2020-11-18 DIAGNOSIS — E55.9 VITAMIN D DEFICIENCY: ICD-10-CM

## 2020-11-18 DIAGNOSIS — R56.9 SEIZURE-LIKE ACTIVITY (HCC): ICD-10-CM

## 2020-11-18 DIAGNOSIS — R94.01 ABNORMAL EEG: ICD-10-CM

## 2020-11-18 DIAGNOSIS — Z09 HOSPITAL DISCHARGE FOLLOW-UP: ICD-10-CM

## 2020-11-18 PROCEDURE — 99215 OFFICE O/P EST HI 40 MIN: CPT | Performed by: NURSE PRACTITIONER

## 2020-11-18 RX ORDER — LEVETIRACETAM 1000 MG/1
1000 TABLET ORAL 2 TIMES DAILY
Qty: 60 TAB | Refills: 11 | Status: SHIPPED | OUTPATIENT
Start: 2020-11-18 | End: 2021-09-16 | Stop reason: SDUPTHER

## 2020-11-18 ASSESSMENT — FIBROSIS 4 INDEX: FIB4 SCORE: 0.99

## 2020-11-18 ASSESSMENT — PATIENT HEALTH QUESTIONNAIRE - PHQ9: CLINICAL INTERPRETATION OF PHQ2 SCORE: 0

## 2021-09-16 DIAGNOSIS — R94.01 ABNORMAL EEG: ICD-10-CM

## 2021-09-16 DIAGNOSIS — R56.9 SEIZURE-LIKE ACTIVITY (HCC): ICD-10-CM

## 2021-09-16 RX ORDER — LEVETIRACETAM 1000 MG/1
1000 TABLET ORAL 2 TIMES DAILY
Qty: 60 TABLET | Refills: 5 | Status: SHIPPED | OUTPATIENT
Start: 2021-09-16 | End: 2021-11-29 | Stop reason: SDUPTHER

## 2021-10-25 NOTE — PROGRESS NOTES
Chief Complaint   Patient presents with   • Follow-Up     Seizures       Problem List Items Addressed This Visit     None      Visit Diagnoses     Seizure-like activity (HCC)        Relevant Orders    CBC WITH DIFFERENTIAL    Comp Metabolic Panel    KEPPRA    Referral to Neurodiagnostics (EEG,EP,EMG/NCS/DBS)    Vitamin D deficiency        Relevant Orders    VITAMIN D,25 HYDROXY          Interim History:  Dung José 46 y.o. male presents today with partner, Davina, for seizure f/u.     Pt reports of no spells or seizures. No side effects to keppra 1000mg bid. Mood is good. He thought that he is here today for  His 24hr EEG and dmv paperwork. He is taking vit D. He hasn't had blood work done. No other issues.       Past medical history:   Past Medical History:   Diagnosis Date   • Diabetes (HCC)    • Hypertension        Past surgical history:   No past surgical history on file.    Family history:   No family history on file.    Social history:   Social History     Socioeconomic History   • Marital status: Single     Spouse name: Not on file   • Number of children: Not on file   • Years of education: Not on file   • Highest education level: Not on file   Occupational History   • Not on file   Tobacco Use   • Smoking status: Former Smoker   • Smokeless tobacco: Former User   Vaping Use   • Vaping Use: Never used   Substance and Sexual Activity   • Alcohol use: Not Currently   • Drug use: Not on file   • Sexual activity: Not on file   Other Topics Concern   • Not on file   Social History Narrative   • Not on file     Social Determinants of Health     Financial Resource Strain:    • Difficulty of Paying Living Expenses:    Food Insecurity:    • Worried About Running Out of Food in the Last Year:    • Ran Out of Food in the Last Year:    Transportation Needs:    • Lack of Transportation (Medical):    • Lack of Transportation (Non-Medical):    Physical Activity:    • Days of Exercise per Week:    • Minutes of Exercise per  "Session:    Stress:    • Feeling of Stress :    Social Connections:    • Frequency of Communication with Friends and Family:    • Frequency of Social Gatherings with Friends and Family:    • Attends Voodoo Services:    • Active Member of Clubs or Organizations:    • Attends Club or Organization Meetings:    • Marital Status:    Intimate Partner Violence:    • Fear of Current or Ex-Partner:    • Emotionally Abused:    • Physically Abused:    • Sexually Abused:        Current medications:   Current Outpatient Medications   Medication   • levetiracetam (KEPPRA) 1000 MG tablet   • atorvastatin (LIPITOR) 40 MG Tab   • hydroCHLOROthiazide (HYDRODIURIL) 25 MG Tab   • metFORMIN (GLUCOPHAGE) 850 MG Tab   • amLODIPine (NORVASC) 10 MG Tab     No current facility-administered medications for this visit.       Medication Allergy:  No Known Allergies      Review of systems:     General: Denies fevers or chills, or nightsweats, or generalized fatigue.    Head: Denies headaches or dizziness or lightheadedness  Dermatologic:  Denies new rash  Musculoskeletal: Denies muscle pain or swelling, no atrophy, no neck and back pain or stiffness.   Neurologic: Denies facial droopiness, muscle weakness (focal or generalized), paresthesias, ataxia, change in speech or language, memory loss, abnormal movements, seizures, loss of consciousness, or episodes of confusion.   Psychiatric: Denies anxiety, depression, mood swings, suicidal or homicidal thoughts       Physical examination:   Vitals:    11/11/21 0716   BP: 128/78   BP Location: Right arm   Patient Position: Sitting   BP Cuff Size: Large adult   Pulse: 80   Resp: 16   Temp: 36.7 °C (98 °F)   TempSrc: Temporal   SpO2: 96%   Weight: 111 kg (245 lb 9.6 oz)   Height: 1.854 m (6' 1\")     General: Patient in no acute distress, pleasant and cooperative.  HEENT: Normocephalic, no signs of acute trauma.   Neck: Supple. There is normal range of motion.   Skin: no signs of acute rashes or " trauma.   Musculoskeletal: joints exhibit full range of motion  Psychiatric: No hallucinatory behavior. No symptoms of depression or suicidal ideation. Mood and affect appear normal on exam.     NEUROLOGICAL EXAM:   Mental status, orientation: Awake, alert and fully oriented.   Speech and language: speech is clear and fluent. The patient is able to name, repeat and comprehend.   Memory: There is intact recollection of recent and remote events.   Motor exam: Strength is 5/5 in all extremities. Tone is normal. No abnormal movements were seen on exam.   Sensory exam reveals normal sense of light touch in all extremities.   Deep tendon reflexes:  2+ throughout.   Gait: The patient was able to get up from seated position on first attempt without requiring assistance. Found to be steady when walking.       ANCILLARY DATA REVIEWED:       Lab Data Review:  Reviewed in chart.     Records reviewed:   Reviewed in chart.    Imaging:   MRI brain w & w/o 7/19/2020  1.  There is no evidence of mesial temporal sclerosis.  2.  There is no evidence of cortical dysplasia or neuronal migrational abnormality.  3.  A few nonspecific T2 hyperintensities in the supratentorial white matter likely representing nonspecific foci of gliosis/chronic ischemia.     EEG:  Routine EEG 7/20/2020  This is an abnormal routine EEG recording in the awake and drowsy state(s). Frequent runs of high amplitude frontal intermittent rhythmic delta activity (FIRDA) noted during the study.  The findings may increased risk for seizures, and suggest underlying structural abnormality, cortical irritability, and or increased intracranial pressure.  No seizures captured during the study.  Clinical and radiological correlation is recommended.        ASSESSMENT AND PLAN:    1. Seizure-like activity (HCC)  - CBC WITH DIFFERENTIAL; Future  - Comp Metabolic Panel; Future  - KEPPRA; Future  - Referral to Neurodiagnostics (EEG,EP,EMG/NCS/DBS)    2. Vitamin D deficiency  -  VITAMIN D,25 HYDROXY; Future        CLINICAL DISCUSSION:  Possible focal seizure likely provoked by metabolic abnormalities when he was critically ill in July 2020. HgA1c then was 14.6. His EEG was abnormal so he was started on keppra 1000mg BID. His MRI brain noted nonspecific white matter abnormalities likely related to his vascular risk factors. He continues to be spell free and they will talk about weaning off ASM and doing 24hr EEG prior to doing so. Discussed risk of breakthrough seizure with challenging the diagnosis.      No family hx of seizures. No TBI hx     He does not drink alcohol. Not smoking cigarettes. No recreational drug use.      Mood is good. No suicidal or homicidal thoughts.      Past ASM's:none     Current ASM's: keppra 1000mg BID        Plan:  - 24hr EEG- they will think about this and decide if they want to pursue.      -Continue ASM for now.      - Discussed avoidance of spell/sz triggers: alcohol, sleep deprivation, energy drinks and stress.     - Discussed Vit D supplementation. Recommended taking 2000-5000u daily.     - Discussed driving restrictions. Okay to drive but aware to stop driving immediately if a spell occurs and report to us.  DMV paperwork faxed and copy given to pt.      -Labs to be checked for next appointment: CBC, CMP, Keppra, vit D.         FOLLOW-UP:   Return in about 1 year (around 11/11/2022), or if symptoms worsen or fail to improve.      EDUCATION AND COUNSELING:  -Education was provided to the patient and/or family regarding diagnosis and prognosis. The chronic and unpredictable nature of the condition were discussed. There is increased risk for additional events, which may carry potential for significant injuries and death. Discussed frequent seizure triggers: sleep deprivation, medication non-compliance, use of illegal drugs/alcohol, stress, and others.   -We reviewed in detail the current antiepileptic regimen. Potential side effects of antiepileptics were  discussed at length, including but no limited to: hypersensitivity reactions (rash and others, some of which can be fatal), visual field changes (some of which may be irreversible), glaucoma, diplopia, kidney stones, osteopenia/osteoporosis/bone fractures, hyperthermia/anhydrosis, hyponatremia, tremors/abnormal movements, ataxia, dizziness, fatigue, increased risk for falls, risk for cardiac arrhythmias/syncope, gastrointestinal side effects(hepatitis, pancreatitis, gastritis, ulcers), gingival hypertrophy/bleeding, drowsiness, sedation, anxiety/nervousness, increased risk for suicide, increased risk for depression, and psychosis.   -We also reviewed drug-drug interactions and their potential effect on seizure control and medication side effects.    -Recommend chronic vitamin D supplementation and regular exercise (if not contraindicated).   -Patient/family educated on risk for SUDEP (Sudden Death in Epilepsy). Counseling was provided on the importance of strict medication and follow up compliance. The patient/family understand the risks associated with non-adherence with the medical plan as outlined, including but not limited to an increased risk for breakthrough seizures, which may contribute to injuries, disability, status epilepticus, and even death.   -Counseling was also provided on potential effects of alcohol and other drugs, which may lower seizure threshold and/or affect the metabolism of antiepileptic drugs. We recommend avoidance of alcohol and illegal drugs.  -Avoid sleep deprivation.   -We extensively discussed the aspects related to safety in drivers who suffer from epilepsy. The patient is encourage to report to the Division of Motor Vehicles of any condition and/or spells related to confusion, disorientation, and/or loss of awareness and/or loss of consciousness; as these may pose a safety issue if they occur while operating a motor vehicle. The patient and/or family are ultimately responsible for  exercising caution and abiding to regulations in place.   -Other seizure precautions were discussed at length, including no diving, no skydiving, no climbing or exposure to unprotected heights, no unsupervised swimming, no Jacuzzi or bathing in bathtubs or deep bodies of water. The patient/family have been advised about risks for operating any machinery while suffering from seizures / syncope / epilepsy and/or while taking antiepileptic drugs.   -The patient understands and agrees that due to the complexity of his/her diagnosis, results of any testing and further recommendations will typically be discussed/made during a face to face encounter in my office. The patient and/or family further understands it is their responsibility to keep proper follow up.     Patient/family agree with plan, as outlined.         Brittny Triplett, MSN, APRN, FNP-C  [facility]  Office: [phone]  Fax: [phone]

## 2021-11-11 ENCOUNTER — OFFICE VISIT (OUTPATIENT)
Dept: NEUROLOGY | Facility: MEDICAL CENTER | Age: 46
End: 2021-11-11
Attending: NURSE PRACTITIONER
Payer: COMMERCIAL

## 2021-11-11 VITALS
HEART RATE: 80 BPM | HEIGHT: 73 IN | RESPIRATION RATE: 16 BRPM | WEIGHT: 245.6 LBS | SYSTOLIC BLOOD PRESSURE: 128 MMHG | OXYGEN SATURATION: 96 % | DIASTOLIC BLOOD PRESSURE: 78 MMHG | TEMPERATURE: 98 F | BODY MASS INDEX: 32.55 KG/M2

## 2021-11-11 DIAGNOSIS — E55.9 VITAMIN D DEFICIENCY: ICD-10-CM

## 2021-11-11 DIAGNOSIS — R56.9 SEIZURE-LIKE ACTIVITY (HCC): ICD-10-CM

## 2021-11-11 PROCEDURE — 99214 OFFICE O/P EST MOD 30 MIN: CPT | Performed by: NURSE PRACTITIONER

## 2021-11-11 PROCEDURE — 99211 OFF/OP EST MAY X REQ PHY/QHP: CPT | Performed by: NURSE PRACTITIONER

## 2021-11-11 ASSESSMENT — FIBROSIS 4 INDEX: FIB4 SCORE: 1.01

## 2021-11-25 LAB
25(OH)D3+25(OH)D2 SERPL-MCNC: 79.4 NG/ML (ref 30–100)
ALBUMIN SERPL-MCNC: 4.6 G/DL (ref 4–5)
ALBUMIN/GLOB SERPL: 1.6 {RATIO} (ref 1.2–2.2)
ALP SERPL-CCNC: 65 IU/L (ref 44–121)
ALT SERPL-CCNC: 17 IU/L (ref 0–44)
AST SERPL-CCNC: 11 IU/L (ref 0–40)
BASOPHILS # BLD AUTO: 0.1 X10E3/UL (ref 0–0.2)
BASOPHILS NFR BLD AUTO: 1 %
BILIRUB SERPL-MCNC: 0.7 MG/DL (ref 0–1.2)
BUN SERPL-MCNC: 23 MG/DL (ref 6–24)
BUN/CREAT SERPL: 15 (ref 9–20)
CALCIUM SERPL-MCNC: 9.4 MG/DL (ref 8.7–10.2)
CHLORIDE SERPL-SCNC: 102 MMOL/L (ref 96–106)
CO2 SERPL-SCNC: 26 MMOL/L (ref 20–29)
CREAT SERPL-MCNC: 1.57 MG/DL (ref 0.76–1.27)
EOSINOPHIL # BLD AUTO: 0.3 X10E3/UL (ref 0–0.4)
EOSINOPHIL NFR BLD AUTO: 3 %
ERYTHROCYTE [DISTWIDTH] IN BLOOD BY AUTOMATED COUNT: 12.6 % (ref 11.6–15.4)
GLOBULIN SER CALC-MCNC: 2.9 G/DL (ref 1.5–4.5)
GLUCOSE SERPL-MCNC: 113 MG/DL (ref 65–99)
HCT VFR BLD AUTO: 43.7 % (ref 37.5–51)
HGB BLD-MCNC: 15 G/DL (ref 13–17.7)
IMM GRANULOCYTES # BLD AUTO: 0 X10E3/UL (ref 0–0.1)
IMM GRANULOCYTES NFR BLD AUTO: 0 %
IMMATURE CELLS  115398: NORMAL
LEVETIRACETAM SERPL-MCNC: 20.1 UG/ML (ref 10–40)
LYMPHOCYTES # BLD AUTO: 3 X10E3/UL (ref 0.7–3.1)
LYMPHOCYTES NFR BLD AUTO: 29 %
MCH RBC QN AUTO: 29.3 PG (ref 26.6–33)
MCHC RBC AUTO-ENTMCNC: 34.3 G/DL (ref 31.5–35.7)
MCV RBC AUTO: 85 FL (ref 79–97)
MONOCYTES # BLD AUTO: 0.7 X10E3/UL (ref 0.1–0.9)
MONOCYTES NFR BLD AUTO: 7 %
MORPHOLOGY BLD-IMP: NORMAL
NEUTROPHILS # BLD AUTO: 6.3 X10E3/UL (ref 1.4–7)
NEUTROPHILS NFR BLD AUTO: 60 %
NRBC BLD AUTO-RTO: NORMAL %
PLATELET # BLD AUTO: 308 X10E3/UL (ref 150–450)
POTASSIUM SERPL-SCNC: 4.3 MMOL/L (ref 3.5–5.2)
PROT SERPL-MCNC: 7.5 G/DL (ref 6–8.5)
RBC # BLD AUTO: 5.12 X10E6/UL (ref 4.14–5.8)
SODIUM SERPL-SCNC: 142 MMOL/L (ref 134–144)
WBC # BLD AUTO: 10.3 X10E3/UL (ref 3.4–10.8)

## 2021-11-29 ENCOUNTER — OFFICE VISIT (OUTPATIENT)
Dept: MEDICAL GROUP | Facility: CLINIC | Age: 46
End: 2021-11-29
Payer: COMMERCIAL

## 2021-11-29 VITALS
HEART RATE: 73 BPM | SYSTOLIC BLOOD PRESSURE: 130 MMHG | OXYGEN SATURATION: 95 % | WEIGHT: 248.4 LBS | HEIGHT: 73 IN | BODY MASS INDEX: 32.92 KG/M2 | DIASTOLIC BLOOD PRESSURE: 85 MMHG

## 2021-11-29 DIAGNOSIS — I10 ESSENTIAL HYPERTENSION: ICD-10-CM

## 2021-11-29 DIAGNOSIS — E11.65 UNCONTROLLED TYPE 2 DIABETES MELLITUS WITH HYPERGLYCEMIA (HCC): ICD-10-CM

## 2021-11-29 DIAGNOSIS — R94.01 ABNORMAL EEG: ICD-10-CM

## 2021-11-29 DIAGNOSIS — E66.9 OBESITY (BMI 30-39.9): ICD-10-CM

## 2021-11-29 DIAGNOSIS — N17.9 ACUTE RENAL FAILURE, UNSPECIFIED ACUTE RENAL FAILURE TYPE (HCC): ICD-10-CM

## 2021-11-29 DIAGNOSIS — R56.9 SEIZURE-LIKE ACTIVITY (HCC): ICD-10-CM

## 2021-11-29 PROCEDURE — 99204 OFFICE O/P NEW MOD 45 MIN: CPT | Performed by: FAMILY MEDICINE

## 2021-11-29 RX ORDER — HYDROCHLOROTHIAZIDE 25 MG/1
25 TABLET ORAL DAILY
COMMUNITY
End: 2021-11-29 | Stop reason: SDUPTHER

## 2021-11-29 RX ORDER — AMLODIPINE BESYLATE 10 MG/1
10 TABLET ORAL DAILY
Qty: 90 TABLET | Refills: 3 | Status: SHIPPED | OUTPATIENT
Start: 2021-11-29 | End: 2022-11-24

## 2021-11-29 RX ORDER — HYDROCHLOROTHIAZIDE 25 MG/1
25 TABLET ORAL DAILY
Qty: 90 TABLET | Refills: 3 | Status: SHIPPED | OUTPATIENT
Start: 2021-11-29 | End: 2022-11-24

## 2021-11-29 RX ORDER — ATORVASTATIN CALCIUM 40 MG/1
40 TABLET, FILM COATED ORAL EVERY EVENING
Qty: 90 TABLET | Refills: 3 | Status: SHIPPED | OUTPATIENT
Start: 2021-11-29 | End: 2022-11-24

## 2021-11-29 RX ORDER — LEVETIRACETAM 1000 MG/1
1000 TABLET ORAL 2 TIMES DAILY
Qty: 180 TABLET | Refills: 5 | Status: SHIPPED | OUTPATIENT
Start: 2021-11-29 | End: 2022-02-15 | Stop reason: SDUPTHER

## 2021-11-29 ASSESSMENT — FIBROSIS 4 INDEX: FIB4 SCORE: 0.41

## 2021-11-29 NOTE — ASSESSMENT & PLAN NOTE
Body mass index is 32.77 kg/m². The patient was counseled on the importance of eating a low carbohydrate diet and avoiding sugary beverages.  Recommendations were made for a healthy lifestyle with increasing physical activity.

## 2021-11-29 NOTE — ASSESSMENT & PLAN NOTE
Well-controlled. Labs as indicated. Continue antihypertensive medications. Discussed decreasing salt intake. Emphasized benefits of exercise and diet. Continue to monitor.

## 2021-11-29 NOTE — PROGRESS NOTES
"Subjective:       HISTORY OF THE PRESENT ILLNESS: Patient is a 46 y.o. male. This pleasant patient is here today to establish care and discuss DM2, HTN, HLD. His/her prior PCP was [facility].    Problem   Obesity (Bmi 30-39.9)   Essential Hypertension   Acute Renal Failure (Hcc)       Current Outpatient Medications Ordered in Epic   Medication Sig Dispense Refill   • amLODIPine (NORVASC) 10 MG Tab Take 1 Tablet by mouth every day. 90 Tablet 3   • atorvastatin (LIPITOR) 40 MG Tab Take 1 Tablet by mouth every evening. 90 Tablet 3   • hydroCHLOROthiazide (HYDRODIURIL) 25 MG Tab Take 1 Tablet by mouth every day. 90 Tablet 3   • levetiracetam (KEPPRA) 1000 MG tablet Take 1 Tablet by mouth 2 times a day. 180 Tablet 5   • metFORMIN (GLUCOPHAGE) 850 MG Tab Take 1 Tablet by mouth 2 times a day with meals. 180 Tablet 3     No current Epic-ordered facility-administered medications on file.         ROS:   Gen: no fevers/chills  Eyes: no changes in vision  ENT: no sore throat, no hearing loss, no bloody nose  Pulm: no sob, no cough  CV: no chest pain, no palpitations  GI: no nausea/vomiting, no diarrhea  : no dysuria  MSk: no myalgias  Skin: no rash  Neuro: no headaches, no numbness/tingling        Objective:       Exam: /85   Pulse 73   Ht 1.854 m (6' 1\")   Wt 113 kg (248 lb 6.4 oz)   SpO2 95%  Body mass index is 32.77 kg/m².    General: Normal appearing. No distress.  HEENT: Normocephalic. Eyes conjunctiva clear, pupils equal and reactive to light, canals are clear bilaterally, tympanic membranes are benign, nasal mucosa benign, oropharynx is without erythema, edema or exudates.   Neck: Supple, Thyroid is not enlarged.  Pulmonary: Clear to ausculation.  Normal effort. No rales, ronchi, or wheezing.  Cardiovascular: Regular rate and rhythm without murmur. Carotid and radial pulses are intact and equal bilaterally.  Abdomen: Soft, nontender, nondistended. Normal bowel sounds. No HSM  Neurologic: Grossly nonfocal  Lymph: No " cervical or supraclavicular lymph nodes are palpable  Skin: Warm and dry.  No obvious lesions.  Musculoskeletal: Normal gait. No extremity cyanosis, clubbing, or edema.  Psych: Normal mood and affect. Alert and oriented x3. Judgment and insight is normal.        Assessment & Plan:   46 y.o. male with the following -    Problem List Items Addressed This Visit     Uncontrolled type 2 diabetes mellitus with hyperglycemia (HCC)    Relevant Medications    metFORMIN (GLUCOPHAGE) 850 MG Tab    Other Relevant Orders    HEMOGLOBIN A1C    MICROALBUMIN CREAT RATIO URINE    Hemoglobin A1c    Microalbumin Creat Ratio Urine - Lab Collect    Acute renal failure (HCC)    Relevant Orders    Basic Metabolic Panel    Essential hypertension     Well-controlled. Labs as indicated. Continue antihypertensive medications. Discussed decreasing salt intake. Emphasized benefits of exercise and diet. Continue to monitor.         Relevant Medications    amLODIPine (NORVASC) 10 MG Tab    atorvastatin (LIPITOR) 40 MG Tab    hydroCHLOROthiazide (HYDRODIURIL) 25 MG Tab    Obesity (BMI 30-39.9)     Body mass index is 32.77 kg/m². The patient was counseled on the importance of eating a low carbohydrate diet and avoiding sugary beverages.  Recommendations were made for a healthy lifestyle with increasing physical activity.         Relevant Medications    metFORMIN (GLUCOPHAGE) 850 MG Tab      Other Visit Diagnoses     Seizure-like activity (HCC)        Relevant Medications    levetiracetam (KEPPRA) 1000 MG tablet    Abnormal EEG        Relevant Medications    levetiracetam (KEPPRA) 1000 MG tablet          No follow-ups on file.    Please note that this dictation was created using voice recognition software. I have made every reasonable attempt to correct obvious errors, but I expect that there are errors of grammar and possibly content that I did not discover before finalizing the note.

## 2021-12-03 ENCOUNTER — TELEPHONE (OUTPATIENT)
Dept: MEDICAL GROUP | Facility: CLINIC | Age: 46
End: 2021-12-03

## 2021-12-03 DIAGNOSIS — N18.31 STAGE 3A CHRONIC KIDNEY DISEASE: ICD-10-CM

## 2021-12-03 DIAGNOSIS — E11.65 UNCONTROLLED TYPE 2 DIABETES MELLITUS WITH HYPERGLYCEMIA (HCC): ICD-10-CM

## 2021-12-03 DIAGNOSIS — R80.9 PROTEINURIA, UNSPECIFIED TYPE: ICD-10-CM

## 2021-12-03 DIAGNOSIS — N28.9 ACUTE RENAL INSUFFICIENCY: ICD-10-CM

## 2021-12-03 NOTE — TELEPHONE ENCOUNTER
Caller Name: Dung José    Call Back Number: [phone] (home)     Patient called and he is very concerned about his recent lab results and would like a phone call from you to discuss those. Phone number on file. Thank you!

## 2021-12-07 ENCOUNTER — HOSPITAL ENCOUNTER (OUTPATIENT)
Dept: RADIOLOGY | Facility: MEDICAL CENTER | Age: 46
End: 2021-12-07
Attending: FAMILY MEDICINE
Payer: COMMERCIAL

## 2021-12-07 DIAGNOSIS — R80.9 PROTEINURIA, UNSPECIFIED TYPE: ICD-10-CM

## 2021-12-07 PROCEDURE — 76775 US EXAM ABDO BACK WALL LIM: CPT

## 2021-12-15 ENCOUNTER — TELEPHONE (OUTPATIENT)
Dept: NEUROLOGY | Facility: MEDICAL CENTER | Age: 46
End: 2021-12-15

## 2021-12-15 ENCOUNTER — TELEPHONE (OUTPATIENT)
Dept: MEDICAL GROUP | Facility: CLINIC | Age: 46
End: 2021-12-15

## 2021-12-15 NOTE — TELEPHONE ENCOUNTER
Called pt to return  about DMV paperwork. Pt stated at first that he did not leave a voicemail. I asked if he contacted the neurologist office and then he said he did, but that issue was 7 hours ago. Pt said he was surprised it took 7 hours to get back to him. Pt said matter was taken care of hours ago. Pt hung up.

## 2021-12-20 ENCOUNTER — TELEPHONE (OUTPATIENT)
Dept: MEDICAL GROUP | Facility: CLINIC | Age: 46
End: 2021-12-20

## 2021-12-20 NOTE — TELEPHONE ENCOUNTER
Dung would like a call back on his US results. He is also wondering if he needs to do a 24 hr urine sample.

## 2021-12-23 NOTE — TELEPHONE ENCOUNTER
I talked with Dung and he wants to know why he does not need a 24 hr urine test. He has an appt booked with a kidney specialist at the end of January and he said he spoke with you about getting this done before his appt in January. He wants to know if he should still get the test. He is frustrated because he does not understand why he would not get this test given you spoke about it in the appt.

## 2021-12-28 DIAGNOSIS — R80.9 PROTEINURIA, UNSPECIFIED TYPE: ICD-10-CM

## 2021-12-30 ENCOUNTER — HOSPITAL ENCOUNTER (OUTPATIENT)
Facility: MEDICAL CENTER | Age: 46
End: 2021-12-30
Attending: FAMILY MEDICINE
Payer: COMMERCIAL

## 2021-12-30 DIAGNOSIS — R80.9 PROTEINURIA, UNSPECIFIED TYPE: ICD-10-CM

## 2021-12-30 LAB
PROT 24H UR-MCNC: 1449 MG/24 HR (ref 30–150)
PROT 24H UR-MRATE: 63 MG/DL (ref 0–15)
SPECIMEN VOL UR: 2300 ML

## 2021-12-30 PROCEDURE — 84156 ASSAY OF PROTEIN URINE: CPT

## 2021-12-30 PROCEDURE — 81050 URINALYSIS VOLUME MEASURE: CPT

## 2022-01-03 ENCOUNTER — TELEPHONE (OUTPATIENT)
Dept: MEDICAL GROUP | Facility: CLINIC | Age: 47
End: 2022-01-03

## 2022-01-06 ENCOUNTER — OFFICE VISIT (OUTPATIENT)
Dept: MEDICAL GROUP | Facility: CLINIC | Age: 47
End: 2022-01-06
Payer: COMMERCIAL

## 2022-01-06 VITALS
BODY MASS INDEX: 33.27 KG/M2 | HEART RATE: 72 BPM | HEIGHT: 73 IN | OXYGEN SATURATION: 100 % | WEIGHT: 251 LBS | TEMPERATURE: 97.6 F

## 2022-01-06 DIAGNOSIS — N18.9 CHRONIC KIDNEY DISEASE, UNSPECIFIED CKD STAGE: ICD-10-CM

## 2022-01-06 DIAGNOSIS — I10 ESSENTIAL HYPERTENSION: ICD-10-CM

## 2022-01-06 DIAGNOSIS — E11.65 UNCONTROLLED TYPE 2 DIABETES MELLITUS WITH HYPERGLYCEMIA (HCC): ICD-10-CM

## 2022-01-06 DIAGNOSIS — R56.9 NEW ONSET SEIZURE (HCC): ICD-10-CM

## 2022-01-06 PROCEDURE — 99214 OFFICE O/P EST MOD 30 MIN: CPT | Mod: GC | Performed by: STUDENT IN AN ORGANIZED HEALTH CARE EDUCATION/TRAINING PROGRAM

## 2022-01-06 ASSESSMENT — FIBROSIS 4 INDEX: FIB4 SCORE: 0.48

## 2022-01-06 NOTE — PROGRESS NOTES
[facility]     PATIENT ID:  NAME:  Dung José  MRN:               [id number]  YOB: 1975    Date: 8:35 AM      Resident: Ascencion Wasserman DO    CC:  F/u       HPI: Dung José is a 46 y.o. male who presented for f/u     Problem   Chronic Kidney Disease    - Patient w/ CKD presumed from DM2 and/or Keppra  - Patient w/ creatinine from 1.57 to 1.51  - 24 hour urine with elevated protinuria --> 1449   - Plan to follow up with Nephrology   1/26/22 appointment with Nephro     Essential Hypertension    - Checks BP at home, currently on HCTZ and Amlodipine  - 130s systolic typically at home  - declined BP monitoring today      New Onset Seizure (Hcc)    - Seizures diagnosed 2020 after admitted to ICU for AMS  - Patient now on Keppra 1000mg daily  - Patient w/o seizures and now seizure free  - Patient passed 's test   - Plan for EEG in the future to determine if patient needs to be on this long term     Uncontrolled Type 2 Diabetes Mellitus With Hyperglycemia (Hcc)    - Diagnosed 2020 after patient was hospitalized in the ICU, on a ventilator. Thoughts were that his AMS was due to his uncontrolled newly diagnosed DM2  - Patient w/ initial A1C of 15, now 5.6%  - Working on losing weight and exercising consistently         REVIEW OF SYSTEMS:   Ten systems reviewed and were negative except as noted in the HPI.                PROBLEM LIST  Patient Active Problem List   Diagnosis   • Prolonged Q-T interval on ECG   • New onset seizure (HCC)   • Class 1 obesity due to excess calories without serious comorbidity with body mass index (BMI) of 31.0 to 31.9 in adult   • Uncontrolled type 2 diabetes mellitus with hyperglycemia (HCC)   • Acute renal failure (HCC)   • Essential hypertension   • Obesity (BMI 30-39.9)   • Chronic kidney disease        PAST SURGICAL HISTORY:  No past surgical history on file.    FAMILY HISTORY:  No family history on file.    SOCIAL HISTORY:   Social History     Tobacco Use  "  • Smoking status: Former Smoker   • Smokeless tobacco: Former User   • Tobacco comment: no smoking in 20 years   Substance Use Topics   • Alcohol use: Not Currently       ALLERGIES:  No Known Allergies    OUTPATIENT MEDICATIONS:    Current Outpatient Medications:   •  amLODIPine (NORVASC) 10 MG Tab, Take 1 Tablet by mouth every day., Disp: 90 Tablet, Rfl: 3  •  atorvastatin (LIPITOR) 40 MG Tab, Take 1 Tablet by mouth every evening., Disp: 90 Tablet, Rfl: 3  •  hydroCHLOROthiazide (HYDRODIURIL) 25 MG Tab, Take 1 Tablet by mouth every day., Disp: 90 Tablet, Rfl: 3  •  levetiracetam (KEPPRA) 1000 MG tablet, Take 1 Tablet by mouth 2 times a day., Disp: 180 Tablet, Rfl: 5  •  metFORMIN (GLUCOPHAGE) 850 MG Tab, Take 1 Tablet by mouth 2 times a day with meals., Disp: 180 Tablet, Rfl: 3    PHYSICAL EXAM:  Vitals:    01/06/22 0810   Pulse: 72   Temp: 36.4 °C (97.6 °F)   TempSrc: Tympanic   SpO2: 100%   Weight: 114 kg (251 lb)   Height: 1.854 m (6' 1\")       General: Pt resting in NAD, cooperative   Skin:  Pink, warm and dry.  HEENT: NC/AT. EOMI.  Lungs:  Symmetrical.  CTAB, good air movement   Cardiovascular:  S1/S2 RRR   Extremities:  Full range of motion.  CNS:  Muscle tone is normal. No gross focal neurologic deficits      ASSESSMENT/PLAN:   46 y.o. male     Problem List Items Addressed This Visit     New onset seizure (HCC)     - Seizures diagnosed 2020 after admitted to ICU for AMS  - Patient now on Keppra 1000mg daily  - Patient w/o seizures and now seizure free  - Patient passed 's test   - Plan for EEG in the future to determine if patient needs to be on this long term    - Plan for EEG   - F/u with neurology          Relevant Orders    Referral to Neurology    Uncontrolled type 2 diabetes mellitus with hyperglycemia (HCC)    Essential hypertension     - Checks BP at home, currently on HCTZ and Amlodipine  - 130s systolic typically at home  - declined BP monitoring today     - F/u with Nephro  - Recommend " patient start ACEi or ARB  - patient would like to wait to start either of these until he sees nephro         Chronic kidney disease     - Patient w/ CKD presumed from DM2 and/or Keppra  - Patient w/ creatinine from 1.57 to 1.51  - 24 hour urine with elevated protinuria --> 1449   - Plan to follow up with Nephrology   1/26/22 appointment with Nephro    - Patient planning on f/u with Nephro in Jan 2022  -                  Ascencion Wasserman, DO  PGY-3  [facility]

## 2022-01-06 NOTE — ASSESSMENT & PLAN NOTE
- Patient w/ CKD presumed from DM2 and/or Keppra  - Patient w/ creatinine from 1.57 to 1.51  - 24 hour urine with elevated protinuria --> 1449   - Plan to follow up with Nephrology   1/26/22 appointment with Nephro    - Patient planning on f/u with Nephro in Jan 2022  - F/u in 4-6 weeks after nephro appointment

## 2022-01-06 NOTE — ASSESSMENT & PLAN NOTE
- Checks BP at home, currently on HCTZ and Amlodipine  - 130s systolic typically at home  - declined BP monitoring today     - F/u with Nephro  - Recommend patient start ACEi or ARB  - patient would like to wait to start either of these until he sees nephro

## 2022-01-11 ENCOUNTER — OFFICE VISIT (OUTPATIENT)
Dept: NEPHROLOGY | Facility: MEDICAL CENTER | Age: 47
End: 2022-01-11
Payer: COMMERCIAL

## 2022-01-11 VITALS
HEIGHT: 73 IN | DIASTOLIC BLOOD PRESSURE: 80 MMHG | BODY MASS INDEX: 33.13 KG/M2 | WEIGHT: 250 LBS | HEART RATE: 71 BPM | TEMPERATURE: 98.8 F | OXYGEN SATURATION: 99 % | SYSTOLIC BLOOD PRESSURE: 133 MMHG

## 2022-01-11 DIAGNOSIS — R56.9 SEIZURE (HCC): ICD-10-CM

## 2022-01-11 DIAGNOSIS — E11.22 TYPE 2 DIABETES MELLITUS WITH STAGE 3A CHRONIC KIDNEY DISEASE, WITHOUT LONG-TERM CURRENT USE OF INSULIN (HCC): ICD-10-CM

## 2022-01-11 DIAGNOSIS — N18.31 TYPE 2 DIABETES MELLITUS WITH STAGE 3A CHRONIC KIDNEY DISEASE, WITHOUT LONG-TERM CURRENT USE OF INSULIN (HCC): ICD-10-CM

## 2022-01-11 DIAGNOSIS — I10 ESSENTIAL HYPERTENSION: ICD-10-CM

## 2022-01-11 DIAGNOSIS — N18.31 STAGE 3A CHRONIC KIDNEY DISEASE: ICD-10-CM

## 2022-01-11 DIAGNOSIS — R80.9 PROTEINURIA, UNSPECIFIED TYPE: ICD-10-CM

## 2022-01-11 PROCEDURE — 99204 OFFICE O/P NEW MOD 45 MIN: CPT | Performed by: INTERNAL MEDICINE

## 2022-01-11 RX ORDER — ERGOCALCIFEROL 1.25 MG/1
5000 CAPSULE ORAL DAILY
COMMUNITY

## 2022-01-11 RX ORDER — LISINOPRIL 10 MG/1
10 TABLET ORAL DAILY
Qty: 30 TABLET | Refills: 11 | Status: SHIPPED
Start: 2022-01-11 | End: 2022-09-07

## 2022-01-11 ASSESSMENT — ENCOUNTER SYMPTOMS
NERVOUS/ANXIOUS: 1
COUGH: 0
NAUSEA: 0
FEVER: 0
VOMITING: 0
CHILLS: 0
SHORTNESS OF BREATH: 0

## 2022-01-11 ASSESSMENT — FIBROSIS 4 INDEX: FIB4 SCORE: 0.48

## 2022-01-11 NOTE — PROGRESS NOTES
"Subjective     Dung José is a 46 y.o. male who presents with Chronic Kidney Disease            Patient is a pleasant 46-year-old gentleman, who was admitted in July 2020 with sudden onset seizure, patient was diagnosed with severe hyperglycemia, uncontrolled diabetes.  Also found to have acute kidney injury, creatinine was 2.4 mg/dL in July 2020.  Patient has been doing better since, diabetes has been very well controlled, patient had major life change including diet and exercise.  Kidney function has improved, patient continues to have nonnephrotic range proteinuria.    Chronic Kidney Disease  This is a chronic problem. The current episode started more than 1 year ago. The problem has been gradually improving. Pertinent negatives include no chest pain, chills, coughing, fever, nausea, urinary symptoms or vomiting. Exacerbated by: Diabetes mellitus.       Review of Systems   Constitutional: Negative for chills, fever and malaise/fatigue.   Respiratory: Negative for cough and shortness of breath.    Cardiovascular: Negative for chest pain and leg swelling.   Gastrointestinal: Negative for nausea and vomiting.   Genitourinary: Negative for dysuria, frequency and urgency.   Psychiatric/Behavioral: The patient is nervous/anxious.               Objective     /80 (BP Location: Right arm, Patient Position: Sitting)   Pulse 71   Temp 37.1 °C (98.8 °F) (Temporal)   Ht 1.854 m (6' 1\")   Wt 113 kg (250 lb)   SpO2 99%   BMI 32.98 kg/m²      Physical Exam  Vitals and nursing note reviewed.   Constitutional:       General: He is not in acute distress.     Appearance: He is not ill-appearing.   HENT:      Head: Normocephalic and atraumatic.      Right Ear: External ear normal.      Left Ear: External ear normal.      Nose: Nose normal.   Eyes:      General:         Right eye: No discharge.         Left eye: No discharge.      Conjunctiva/sclera: Conjunctivae normal.   Cardiovascular:      Rate and Rhythm: Normal " rate and regular rhythm.      Heart sounds: No murmur heard.      Pulmonary:      Effort: Pulmonary effort is normal. No respiratory distress.      Breath sounds: Normal breath sounds. No wheezing.   Musculoskeletal:         General: No tenderness or deformity.      Right lower leg: No edema.      Left lower leg: No edema.   Skin:     General: Skin is warm.   Neurological:      General: No focal deficit present.      Mental Status: He is alert and oriented to person, place, and time.   Psychiatric:         Mood and Affect: Mood normal.         Behavior: Behavior normal.       Past Medical History:   Diagnosis Date   • Diabetes (HCC)    • Hypertension      Social History     Socioeconomic History   • Marital status: Single     Spouse name: Not on file   • Number of children: Not on file   • Years of education: Not on file   • Highest education level: Not on file   Occupational History   • Not on file   Tobacco Use   • Smoking status: Former Smoker   • Smokeless tobacco: Former User   • Tobacco comment: no smoking in 20 years   Vaping Use   • Vaping Use: Never used   Substance and Sexual Activity   • Alcohol use: Not Currently   • Drug use: Never   • Sexual activity: Not on file   Other Topics Concern   • Not on file   Social History Narrative   • Not on file     Social Determinants of Health     Financial Resource Strain:    • Difficulty of Paying Living Expenses: Not on file   Food Insecurity:    • Worried About Running Out of Food in the Last Year: Not on file   • Ran Out of Food in the Last Year: Not on file   Transportation Needs:    • Lack of Transportation (Medical): Not on file   • Lack of Transportation (Non-Medical): Not on file   Physical Activity:    • Days of Exercise per Week: Not on file   • Minutes of Exercise per Session: Not on file   Stress:    • Feeling of Stress : Not on file   Social Connections:    • Frequency of Communication with Friends and Family: Not on file   • Frequency of Social  Gatherings with Friends and Family: Not on file   • Attends Mu-ism Services: Not on file   • Active Member of Clubs or Organizations: Not on file   • Attends Club or Organization Meetings: Not on file   • Marital Status: Not on file   Intimate Partner Violence:    • Fear of Current or Ex-Partner: Not on file   • Emotionally Abused: Not on file   • Physically Abused: Not on file   • Sexually Abused: Not on file   Housing Stability:    • Unable to Pay for Housing in the Last Year: Not on file   • Number of Places Lived in the Last Year: Not on file   • Unstable Housing in the Last Year: Not on file     History reviewed. No pertinent family history.  Recent Labs     11/18/21  0516 12/01/21  0606   ALBUMIN 4.6 4.5   SODIUM 142 135   POTASSIUM 4.3 4.3   CHLORIDE 102 96   CO2 26 24   BUN 23 27*   CREATININE 1.57* 1.51*                             Assessment & Plan        1. Essential hypertension  Patient was advised to be on low-sodium diet  Start low-dose lisinopril, patient was advised about the possible side effects    2. Stage 3a chronic kidney disease (HCC)  Most likely diabetic nephropathy  Start low-dose ACE inhibitor  Avoid nephrotoxins like NSAIDs  Optimize diabetes control    3. Type 2 diabetes mellitus with stage 3a chronic kidney disease, without long-term current use of insulin (HCC)  Continue to optimize diabetes control  Consider SGLT2 inhibitor  Recommend eye/retina exam  4. Proteinuria, unspecified type  Secondary diabetic nephropathy  Start low-dose ACE inhibitor    5. Seizure  No clear etiology whether it is epilepsy versus side effect of hyperglycemia  Patient is scheduled to see a neurologist       Over 50% of this 45 minute visit was spent on counseling and coordination of care regarding diet, side effects, and complication.

## 2022-01-26 ENCOUNTER — APPOINTMENT (OUTPATIENT)
Dept: NEPHROLOGY | Facility: MEDICAL CENTER | Age: 47
End: 2022-01-26
Payer: OTHER MISCELLANEOUS

## 2022-01-26 ENCOUNTER — NON-PROVIDER VISIT (OUTPATIENT)
Dept: NEUROLOGY | Facility: MEDICAL CENTER | Age: 47
End: 2022-01-26
Attending: PSYCHIATRY & NEUROLOGY
Payer: COMMERCIAL

## 2022-01-26 DIAGNOSIS — R56.9 SEIZURE-LIKE ACTIVITY (HCC): ICD-10-CM

## 2022-01-26 PROCEDURE — 95700 EEG CONT REC W/VID EEG TECH: CPT | Performed by: PSYCHIATRY & NEUROLOGY

## 2022-01-26 PROCEDURE — 95719 EEG PHYS/QHP EA INCR W/O VID: CPT | Performed by: PSYCHIATRY & NEUROLOGY

## 2022-01-26 PROCEDURE — 95708 EEG WO VID EA 12-26HR UNMNTR: CPT | Performed by: PSYCHIATRY & NEUROLOGY

## 2022-01-26 NOTE — PROCEDURES
24 HR AMBULATORY ELECTROENCEPHALOGRAM REPORT      Referring provider: BETINA Alcantara    DOS:   1/26/2022 with total duration 23 hours and 17 minutes.       INDICATION:  Dung José 46 y.o. male presenting with presumed focal seizure likely provoked by metabolic abnormalities.     CURRENT ANTIEPILEPTIC REGIMEN: LEV     TECHNIQUE: A 30-channel, 24 hrs ambulatory electroencephalogram (aEEG) was performed in accordance with the international 10-20 system. This digital study was reviewed in bipolar and referential montages. The recording examined the patient during wakeful, drowsy and sleep states.     The EEG was set up and taken down by a Neurodiagnostic technologist who performed education to patient and staff.     A minimum but not limited to 23 electrodes and 23 channel recording was setup and performed by Neurodiagnostic technologist.    Impedances, electrode integrity, and technical impressions were documented a minimum of every 2-24 hour period by a Neurodiagnostic Technologist and reviewed by Interpreting physician.       DESCRIPTION OF THE RECORD:  During the awake state, background shows symmetrical 9-10 Hz alpha activity posteriorly with amplitude of 70 mV.  There was reactivity with eye opening/closure.  Normal anterior-posterior gradient was noted with faster beta frequencies seen anteriorly.  During drowsiness, high-amplitude delta frequencies were seen.    During the sleep state, background shows diffuse high-amplitude 4-5 Hz delta activity.  Symmetrical high-amplitude sleep spindles and vertex sharp activities were seen in the central leads.    ACTIVATION PROCEDURES:   Hyperventilation was performed by the patient for a total of 3 minutes. The technician performing the test noted good effort. No physiological build up seen.     Intermittent Photic stimulation was performed in a stepwise fashion from 1 to 30 Hz and elicited a normal response (photic driving), most noticeable in the posterior  leads.    ICTAL AND/OR INTERICTAL FINDINGS:   No focal or generalized epileptiform activity was noted. No regional slowing was seen during this study.  No seizures were recorded during the study.     EVENT(S):  None     EKG: sampling review of EKG recording demonstrated sinus rhythm.       INTERPRETATION:   This is a normal 23 hours ambulatory electroencephalogram recording in the awake, drowsy and sleep state.  No events were captured during the study. Clinical correlation is recommended.    Note: A normal electroencephalogram does not rule out epilepsy.       Larry Trinh MD  Diplomate in Neurology&Epilepsy  Office: [phone]  Fax: [phone]

## 2022-01-27 ENCOUNTER — NON-PROVIDER VISIT (OUTPATIENT)
Dept: NEUROLOGY | Facility: MEDICAL CENTER | Age: 47
End: 2022-01-27
Attending: PSYCHIATRY & NEUROLOGY
Payer: OTHER MISCELLANEOUS

## 2022-01-27 PROCEDURE — 99999 PR NO CHARGE: CPT | Performed by: PSYCHIATRY & NEUROLOGY

## 2022-02-15 ENCOUNTER — OFFICE VISIT (OUTPATIENT)
Dept: MEDICAL GROUP | Facility: CLINIC | Age: 47
End: 2022-02-15
Payer: COMMERCIAL

## 2022-02-15 VITALS
HEART RATE: 84 BPM | OXYGEN SATURATION: 94 % | HEIGHT: 73 IN | DIASTOLIC BLOOD PRESSURE: 90 MMHG | BODY MASS INDEX: 33.8 KG/M2 | WEIGHT: 255 LBS | SYSTOLIC BLOOD PRESSURE: 140 MMHG

## 2022-02-15 DIAGNOSIS — R94.01 ABNORMAL EEG: ICD-10-CM

## 2022-02-15 DIAGNOSIS — N18.9 CHRONIC KIDNEY DISEASE, UNSPECIFIED CKD STAGE: ICD-10-CM

## 2022-02-15 DIAGNOSIS — R56.9 NEW ONSET SEIZURE (HCC): ICD-10-CM

## 2022-02-15 DIAGNOSIS — E11.65 UNCONTROLLED TYPE 2 DIABETES MELLITUS WITH HYPERGLYCEMIA (HCC): ICD-10-CM

## 2022-02-15 DIAGNOSIS — I10 ESSENTIAL HYPERTENSION: ICD-10-CM

## 2022-02-15 DIAGNOSIS — R56.9 SEIZURE-LIKE ACTIVITY (HCC): ICD-10-CM

## 2022-02-15 PROCEDURE — 99214 OFFICE O/P EST MOD 30 MIN: CPT | Mod: GC | Performed by: STUDENT IN AN ORGANIZED HEALTH CARE EDUCATION/TRAINING PROGRAM

## 2022-02-15 RX ORDER — LEVETIRACETAM 1000 MG/1
1000 TABLET ORAL 2 TIMES DAILY
Qty: 180 TABLET | Refills: 5 | Status: SHIPPED | OUTPATIENT
Start: 2022-02-15 | End: 2022-11-02 | Stop reason: SDUPTHER

## 2022-02-15 ASSESSMENT — PATIENT HEALTH QUESTIONNAIRE - PHQ9: CLINICAL INTERPRETATION OF PHQ2 SCORE: 0

## 2022-02-15 ASSESSMENT — FIBROSIS 4 INDEX: FIB4 SCORE: 0.48

## 2022-02-15 NOTE — ASSESSMENT & PLAN NOTE
- Diagnosed 2020 after patient was hospitalized in the ICU, on a ventilator. Thoughts were that his AMS was due to his uncontrolled newly diagnosed DM2  - Patient w/ initial A1C of 15, now 5.6%  - Working on losing weight and exercising consistently  - Interetsed in decreasing dose    - Decreased metformin from 850mg BID to 500mg BID  - F/u in April/May with repeat A1C results

## 2022-02-15 NOTE — ASSESSMENT & PLAN NOTE
- Checks BP at home, currently on HCTZ and Amlodipine and lisinopril   - 130s systolic typically at home  - declined BP monitoring today     - Continue to monitor at home

## 2022-02-15 NOTE — PROGRESS NOTES
[facility]     PATIENT ID:  NAME:  Dung José  MRN:               [id number]  YOB: 1975    Date: 8:40 AM      Resident: Ascencion Wasserman DO    CC: F/u       HPI: Dung José is a 46 y.o. male who presented for f/u     Problem   Chronic Kidney Disease    - Patient w/ CKD presumed from DM2 and/or Keppra  - Patient w/ creatinine from 1.57 to 1.51  - 24 hour urine with elevated protinuria --> 1449   - Followed up with Nephro - started on ACEi  - /90 today here, typically 120s at home     Essential Hypertension    - Checks BP at home, currently on HCTZ and Amlodipine and lisinopril   - 130s systolic typically at home  - declined BP monitoring today      New Onset Seizure (Hcc)    - Seizures diagnosed 2020 after admitted to ICU for AMS  - Patient now on Keppra 1000mg daily  - Patient w/o seizures and now seizure free  - Patient passed 's test   - Has had EEG which was normal per patient  - Has not followed up with Neurology yet      Uncontrolled Type 2 Diabetes Mellitus With Hyperglycemia (Hcc)    - Diagnosed 2020 after patient was hospitalized in the ICU, on a ventilator. Thoughts were that his AMS was due to his uncontrolled newly diagnosed DM2  - Patient w/ initial A1C of 15, now 5.6%  - Working on losing weight and exercising consistently  - Interetsed in decreasing dose         REVIEW OF SYSTEMS:   Ten systems reviewed and were negative except as noted in the HPI.                PROBLEM LIST  Patient Active Problem List   Diagnosis   • Prolonged Q-T interval on ECG   • New onset seizure (HCC)   • Class 1 obesity due to excess calories without serious comorbidity with body mass index (BMI) of 31.0 to 31.9 in adult   • Uncontrolled type 2 diabetes mellitus with hyperglycemia (HCC)   • Acute renal failure (HCC)   • Essential hypertension   • Obesity (BMI 30-39.9)   • Chronic kidney disease        PAST SURGICAL HISTORY:  No past surgical history on file.    FAMILY HISTORY:  No  "family history on file.    SOCIAL HISTORY:   Social History     Tobacco Use   • Smoking status: Former Smoker   • Smokeless tobacco: Former User   • Tobacco comment: no smoking in 20 years   Substance Use Topics   • Alcohol use: Not Currently       ALLERGIES:  No Known Allergies    OUTPATIENT MEDICATIONS:    Current Outpatient Medications:   •  metFORMIN (GLUCOPHAGE) 500 MG Tab, Take 1 Tablet by mouth 2 times a day with meals., Disp: 180 Tablet, Rfl: 1  •  levetiracetam (KEPPRA) 1000 MG tablet, Take 1 Tablet by mouth 2 times a day., Disp: 180 Tablet, Rfl: 5  •  vitamin D2, Ergocalciferol, (DRISDOL) 1.25 MG (78461 UT) Cap capsule, Take 5,000 Units by mouth every day., Disp: , Rfl:   •  lisinopril (PRINIVIL) 10 MG Tab, Take 1 Tablet by mouth every day., Disp: 30 Tablet, Rfl: 11  •  amLODIPine (NORVASC) 10 MG Tab, Take 1 Tablet by mouth every day., Disp: 90 Tablet, Rfl: 3  •  atorvastatin (LIPITOR) 40 MG Tab, Take 1 Tablet by mouth every evening., Disp: 90 Tablet, Rfl: 3  •  hydroCHLOROthiazide (HYDRODIURIL) 25 MG Tab, Take 1 Tablet by mouth every day., Disp: 90 Tablet, Rfl: 3    PHYSICAL EXAM:  Vitals:    02/15/22 0755   BP: 140/90   BP Location: Right arm   Pulse: 84   SpO2: 94%   Weight: 116 kg (255 lb)   Height: 1.854 m (6' 1\")       General: Pt resting in NAD, cooperative   Skin:  Pink, warm and dry.  HEENT: NC/AT. EOMI.  Lungs:  Symmetrical.  CTAB, good air movement   Cardiovascular:  S1/S2 RRR   Extremities:  Full range of motion.  CNS:  Muscle tone is normal. No gross focal neurologic deficits      ASSESSMENT/PLAN:   46 y.o. male     Problem List Items Addressed This Visit     New onset seizure (HCC)     - Seizures diagnosed 2020 after admitted to ICU for AMS  - Patient now on Keppra 1000mg daily  - Patient w/o seizures and now seizure free  - Patient passed 's test   - Has had EEG which was normal per patient  - Has not followed up with Neurology yet     - Recommend patient call neurology office to " discuss discontinuing Keppra  - Patient instructed to call if unable to see neurologist   - Willing to start tapering medicine   Keppra 1000mg daily then decrease to 1000mg every other day   Patient contemplating when he would like to wean   Discussed no driving, machinery, roadbiking while tapering medicine         Relevant Medications    levetiracetam (KEPPRA) 1000 MG tablet    Uncontrolled type 2 diabetes mellitus with hyperglycemia (HCC)     - Diagnosed 2020 after patient was hospitalized in the ICU, on a ventilator. Thoughts were that his AMS was due to his uncontrolled newly diagnosed DM2  - Patient w/ initial A1C of 15, now 5.6%  - Working on losing weight and exercising consistently  - Interetsed in decreasing dose    - Decreased metformin from 850mg BID to 500mg BID  - F/u in April/May with repeat A1C results          Relevant Medications    metFORMIN (GLUCOPHAGE) 500 MG Tab    Essential hypertension     - Checks BP at home, currently on HCTZ and Amlodipine and lisinopril   - 130s systolic typically at home  - declined BP monitoring today     - Continue to monitor at home         Relevant Orders    HEMOGLOBIN A1C    Chronic kidney disease     - Patient w/ CKD presumed from DM2 and/or Keppra  - Patient w/ creatinine from 1.57 to 1.51  - 24 hour urine with elevated protinuria --> 1449   - Followed up with Nephro - started on ACEi  - /90 today here, typically 120s at home    - Continue ACEi  - Follow up with Nephro  - Eye appointment in March scheduled         Relevant Orders    HEMOGLOBIN A1C      Other Visit Diagnoses     Seizure-like activity (HCC)        Relevant Medications    levetiracetam (KEPPRA) 1000 MG tablet    Abnormal EEG        Relevant Medications    levetiracetam (KEPPRA) 1000 MG tablet          Ascencion Wasserman,   PGY-3  [facility]

## 2022-02-15 NOTE — ASSESSMENT & PLAN NOTE
- Patient w/ CKD presumed from DM2 and/or Keppra  - Patient w/ creatinine from 1.57 to 1.51  - 24 hour urine with elevated protinuria --> 1449   - Followed up with Nephro - started on ACEi  - /90 today here, typically 120s at home    - Continue ACEi  - Follow up with Nephro  - Eye appointment in March scheduled

## 2022-02-15 NOTE — ASSESSMENT & PLAN NOTE
- Seizures diagnosed 2020 after admitted to ICU for AMS  - Patient now on Keppra 1000mg daily  - Patient w/o seizures and now seizure free  - Patient passed 's test   - Has had EEG which was normal per patient  - Has not followed up with Neurology yet     - Recommend patient call neurology office to discuss discontinuing Keppra  - Patient instructed to call if unable to see neurologist   - Willing to start tapering medicine   Keppra 1000mg daily then decrease to 1000mg every other day   Patient contemplating when he would like to wean   Discussed no driving, machinery, roadbiking while tapering medicine

## 2022-03-21 NOTE — CARE PLAN
Problem: Knowledge Deficit  Goal: Knowledge of disease process/condition, treatment plan, diagnostic tests, and medications will improve  Outcome: PROGRESSING AS EXPECTED  Intervention: Assess knowledge level of disease process/condition, treatment plan, diagnostic tests, and medications  Note: Pt education provided r/t new diabetes diagnosis. Discussed s/s of hyper and hypoglycemia. Discussed checking blood sugars. Pt stated, 'my girlfriend will do that.' This RN discussed the importance of knowing how to do it without her and taking control of medical management. Reinforcement needed.      Problem: Respiratory:  Goal: Respiratory status will improve  Outcome: PROGRESSING AS EXPECTED  Intervention: Administer and titrate oxygen therapy  Note: Pt remains on RA; lungs clear with diminished bases.       Patient completed Xeloda with Radiation today.

## 2022-04-08 ENCOUNTER — APPOINTMENT (OUTPATIENT)
Dept: RADIOLOGY | Facility: CLINIC | Age: 47
End: 2022-04-08
Attending: STUDENT IN AN ORGANIZED HEALTH CARE EDUCATION/TRAINING PROGRAM
Payer: COMMERCIAL

## 2022-04-08 ENCOUNTER — OFFICE VISIT (OUTPATIENT)
Dept: MEDICAL GROUP | Facility: CLINIC | Age: 47
End: 2022-04-08
Payer: COMMERCIAL

## 2022-04-08 VITALS — WEIGHT: 250 LBS | BODY MASS INDEX: 33.13 KG/M2 | HEIGHT: 73 IN

## 2022-04-08 DIAGNOSIS — M79.671 FOOT PAIN, RIGHT: ICD-10-CM

## 2022-04-08 PROCEDURE — 73630 X-RAY EXAM OF FOOT: CPT | Mod: TC,RT | Performed by: FAMILY MEDICINE

## 2022-04-08 PROCEDURE — 73610 X-RAY EXAM OF ANKLE: CPT | Mod: TC,RT | Performed by: FAMILY MEDICINE

## 2022-04-08 PROCEDURE — 99213 OFFICE O/P EST LOW 20 MIN: CPT | Mod: GC | Performed by: STUDENT IN AN ORGANIZED HEALTH CARE EDUCATION/TRAINING PROGRAM

## 2022-04-08 ASSESSMENT — FIBROSIS 4 INDEX: FIB4 SCORE: 0.48

## 2022-04-08 NOTE — ASSESSMENT & PLAN NOTE
- Patient w/ foot pain x1 week  - Patient got new running shoes 1 week ago. Ran for 1 day. Noticed some swelling and pain next day  - Progressively worsening discomfort  - Now, unable to bear weight, pain over 5th metatarsal, pain lateral aspect of right knee, swelling of foot  - Mild improvement with ice/heat/ibuprofen     - Foot x-ray showed showed no fracture, likely foot sprain  - Boot provided as well as crutches  - Recommend stretching for IT band tightness, Diclofenac cream PRN, Tylenol PRN  - F/u in 2-3 weeks

## 2022-04-08 NOTE — PROGRESS NOTES
[facility] MEDICINE     PATIENT ID:  NAME:  Dung José  MRN:               [id number]  YOB: 1975    Resident: Ascencion Wassemran DO    CC:  Foot pain      HPI: Dung José is a 46 y.o. male who presented with foot pain    Problem   Foot Pain, Right    - Patient w/ foot pain x1 week  - Patient got new running shoes 1 week ago. Ran for 1 day. Noticed some swelling and pain next day  - Progressively worsening discomfort  - Now, unable to bear weight, pain over 5th metatarsal, pain lateral aspect of right knee, swelling of foot  - Mild improvement with ice/heat/ibuprofen          REVIEW OF SYSTEMS:   Ten systems reviewed and were negative except as noted in the HPI.                PROBLEM LIST  Patient Active Problem List   Diagnosis   • Prolonged Q-T interval on ECG   • New onset seizure (HCC)   • Class 1 obesity due to excess calories without serious comorbidity with body mass index (BMI) of 31.0 to 31.9 in adult   • Uncontrolled type 2 diabetes mellitus with hyperglycemia (HCC)   • Acute renal failure (HCC)   • Essential hypertension   • Obesity (BMI 30-39.9)   • Chronic kidney disease   • Foot pain, right        PAST SURGICAL HISTORY:  No past surgical history on file.    FAMILY HISTORY:  No family history on file.    SOCIAL HISTORY:   Social History     Tobacco Use   • Smoking status: Former Smoker   • Smokeless tobacco: Former User   • Tobacco comment: no smoking in 20 years   Substance Use Topics   • Alcohol use: Not Currently       ALLERGIES:  No Known Allergies    OUTPATIENT MEDICATIONS:    Current Outpatient Medications:   •  diclofenac sodium (VOLTAREN) 1 % Gel, Apply 4 g topically 4 times a day as needed., Disp: 350 g, Rfl: 1  •  metFORMIN (GLUCOPHAGE) 500 MG Tab, Take 1 Tablet by mouth 2 times a day with meals., Disp: 180 Tablet, Rfl: 1  •  levetiracetam (KEPPRA) 1000 MG tablet, Take 1 Tablet by mouth 2 times a day., Disp: 180 Tablet, Rfl: 5  •  vitamin D2, Ergocalciferol, (DRISDOL) 1.25  "MG (41359 UT) Cap capsule, Take 5,000 Units by mouth every day., Disp: , Rfl:   •  lisinopril (PRINIVIL) 10 MG Tab, Take 1 Tablet by mouth every day., Disp: 30 Tablet, Rfl: 11  •  amLODIPine (NORVASC) 10 MG Tab, Take 1 Tablet by mouth every day., Disp: 90 Tablet, Rfl: 3  •  atorvastatin (LIPITOR) 40 MG Tab, Take 1 Tablet by mouth every evening., Disp: 90 Tablet, Rfl: 3  •  hydroCHLOROthiazide (HYDRODIURIL) 25 MG Tab, Take 1 Tablet by mouth every day., Disp: 90 Tablet, Rfl: 3    PHYSICAL EXAM:  Vitals:    04/08/22 1308   Weight: 113 kg (250 lb)   Height: 1.854 m (6' 1\")       General: Pt resting in NAD, cooperative   Skin:  Pink, warm and dry.  HEENT: NC/AT. EOMI.  Extremities:  Pain lateral aspect of right knee at IT band insertion site. Pain at base of 5th metatarsal, swelling w/o surrounding erythema. Pain w/ dorsiflexion and plantarflexion   CNS:  Muscle tone is normal. No gross focal neurologic deficits      ASSESSMENT/PLAN:   46 y.o. male     Problem List Items Addressed This Visit     Foot pain, right     - Patient w/ foot pain x1 week  - Patient got new running shoes 1 week ago. Ran for 1 day. Noticed some swelling and pain next day  - Progressively worsening discomfort  - Now, unable to bear weight, pain over 5th metatarsal, pain lateral aspect of right knee, swelling of foot  - Mild improvement with ice/heat/ibuprofen     - Foot x-ray showed showed no fracture, likely foot sprain  - Boot provided as well as crutches  - Recommend stretching for IT band tightness, Diclofenac cream PRN, Tylenol PRN  - F/u in 2-3 weeks          Relevant Orders    DX-ANKLE 3+ VIEWS RIGHT    DX-FOOT-COMPLETE 3+ RIGHT          Ascencion Wasserman, DO  PGY-3  [facility]   "

## 2022-04-20 ENCOUNTER — APPOINTMENT (OUTPATIENT)
Dept: NEPHROLOGY | Facility: MEDICAL CENTER | Age: 47
End: 2022-04-20
Payer: COMMERCIAL

## 2022-05-27 ENCOUNTER — HOSPITAL ENCOUNTER (OUTPATIENT)
Facility: MEDICAL CENTER | Age: 47
End: 2022-05-27
Attending: FAMILY MEDICINE
Payer: COMMERCIAL

## 2022-05-27 DIAGNOSIS — R80.9 PROTEINURIA, UNSPECIFIED TYPE: ICD-10-CM

## 2022-05-27 LAB
PROT 24H UR-MCNC: 210 MG/24 HR (ref 30–150)
PROT 24H UR-MRATE: 14 MG/DL (ref 0–15)
SPECIMEN VOL UR: 1500 ML

## 2022-05-27 PROCEDURE — 84156 ASSAY OF PROTEIN URINE: CPT

## 2022-05-27 PROCEDURE — 81050 URINALYSIS VOLUME MEASURE: CPT

## 2022-06-01 ENCOUNTER — OFFICE VISIT (OUTPATIENT)
Dept: NEPHROLOGY | Facility: MEDICAL CENTER | Age: 47
End: 2022-06-01
Payer: COMMERCIAL

## 2022-06-01 VITALS
HEART RATE: 107 BPM | DIASTOLIC BLOOD PRESSURE: 76 MMHG | BODY MASS INDEX: 33.13 KG/M2 | OXYGEN SATURATION: 98 % | SYSTOLIC BLOOD PRESSURE: 130 MMHG | WEIGHT: 250 LBS | TEMPERATURE: 97.2 F | HEIGHT: 73 IN

## 2022-06-01 DIAGNOSIS — R80.9 PROTEINURIA, UNSPECIFIED TYPE: ICD-10-CM

## 2022-06-01 DIAGNOSIS — N18.31 TYPE 2 DIABETES MELLITUS WITH STAGE 3A CHRONIC KIDNEY DISEASE, WITHOUT LONG-TERM CURRENT USE OF INSULIN (HCC): ICD-10-CM

## 2022-06-01 DIAGNOSIS — E11.22 TYPE 2 DIABETES MELLITUS WITH STAGE 3A CHRONIC KIDNEY DISEASE, WITHOUT LONG-TERM CURRENT USE OF INSULIN (HCC): ICD-10-CM

## 2022-06-01 DIAGNOSIS — N18.31 STAGE 3A CHRONIC KIDNEY DISEASE: ICD-10-CM

## 2022-06-01 DIAGNOSIS — I10 ESSENTIAL HYPERTENSION: ICD-10-CM

## 2022-06-01 PROCEDURE — 99214 OFFICE O/P EST MOD 30 MIN: CPT | Performed by: INTERNAL MEDICINE

## 2022-06-01 ASSESSMENT — ENCOUNTER SYMPTOMS
COUGH: 0
HYPERTENSION: 1
CHILLS: 0
FEVER: 0
SHORTNESS OF BREATH: 0
NAUSEA: 0
VOMITING: 0

## 2022-06-01 ASSESSMENT — FIBROSIS 4 INDEX: FIB4 SCORE: 0.48

## 2022-06-01 NOTE — PROGRESS NOTES
"Subjective     Dung José is a 46 y.o. male who presents with Hypertension and Chronic Kidney Disease            Hypertension  This is a chronic problem. The current episode started more than 1 year ago. The problem is unchanged. The problem is controlled. Pertinent negatives include no chest pain, malaise/fatigue, peripheral edema or shortness of breath. Risk factors for coronary artery disease include diabetes mellitus, obesity and male gender. Past treatments include ACE inhibitors and calcium channel blockers. The current treatment provides significant improvement. There are no compliance problems.  Hypertensive end-organ damage includes kidney disease. Identifiable causes of hypertension include chronic renal disease.   Chronic Kidney Disease  This is a chronic problem. The current episode started more than 1 year ago. The problem occurs constantly. The problem has been unchanged. Pertinent negatives include no chest pain, chills, coughing, fever, nausea, urinary symptoms or vomiting.       Review of Systems   Constitutional: Negative for chills, fever and malaise/fatigue.   Respiratory: Negative for cough and shortness of breath.    Cardiovascular: Negative for chest pain and leg swelling.   Gastrointestinal: Negative for nausea and vomiting.   Genitourinary: Negative for dysuria, frequency and urgency.              Objective     /76 (BP Location: Right arm, Patient Position: Sitting)   Pulse (!) 107   Temp 36.2 °C (97.2 °F) (Temporal)   Ht 1.854 m (6' 1\")   Wt 113 kg (250 lb)   SpO2 98%   BMI 32.98 kg/m²      Physical Exam  Vitals and nursing note reviewed.   Constitutional:       General: He is not in acute distress.     Appearance: He is not ill-appearing.   HENT:      Head: Normocephalic and atraumatic.      Right Ear: External ear normal.      Left Ear: External ear normal.      Nose: Nose normal.   Eyes:      General:         Right eye: No discharge.         Left eye: No discharge.      " Conjunctiva/sclera: Conjunctivae normal.   Cardiovascular:      Rate and Rhythm: Normal rate and regular rhythm.      Heart sounds: No murmur heard.  Pulmonary:      Effort: Pulmonary effort is normal. No respiratory distress.      Breath sounds: Normal breath sounds. No wheezing.   Musculoskeletal:         General: No tenderness or deformity.      Right lower leg: No edema.      Left lower leg: No edema.   Skin:     General: Skin is warm.   Neurological:      General: No focal deficit present.      Mental Status: He is alert and oriented to person, place, and time.   Psychiatric:         Mood and Affect: Mood normal.         Behavior: Behavior normal.       Past Medical History:   Diagnosis Date   • Diabetes (HCC)    • Hypertension      Social History     Socioeconomic History   • Marital status: Single     Spouse name: Not on file   • Number of children: Not on file   • Years of education: Not on file   • Highest education level: Not on file   Occupational History   • Not on file   Tobacco Use   • Smoking status: Former Smoker   • Smokeless tobacco: Former User   • Tobacco comment: no smoking in 20 years   Vaping Use   • Vaping Use: Never used   Substance and Sexual Activity   • Alcohol use: Not Currently   • Drug use: Never   • Sexual activity: Not on file   Other Topics Concern   • Not on file   Social History Narrative   • Not on file     Social Determinants of Health     Financial Resource Strain: Not on file   Food Insecurity: Not on file   Transportation Needs: Not on file   Physical Activity: Not on file   Stress: Not on file   Social Connections: Not on file   Intimate Partner Violence: Not on file   Housing Stability: Not on file     History reviewed. No pertinent family history.  Recent Labs     11/18/21  0516 12/01/21  0606   ALBUMIN 4.6 4.5   SODIUM 142 135   POTASSIUM 4.3 4.3   CHLORIDE 102 96   CO2 26 24   BUN 23 27*   CREATININE 1.57* 1.51*                             Assessment & Plan        1.  Essential hypertension  Controlled  Continue same medication regimen  Continue low-sodium diet      2. Stage 3a chronic kidney disease (HCC)  Patient did not get a chance to do his labs  No uremic symptoms  Renal dose of medication  Avoid nephrotoxins  Continue same medication regimen  Check kidney function test    3. Type 2 diabetes mellitus with stage 3a chronic kidney disease, without long-term current use of insulin ([facility])  Continue to optimize diabetes control    4. Proteinuria, unspecified type  Improved  Continue ACE inhibitor

## 2022-06-03 LAB
BUN SERPL-MCNC: 33 MG/DL (ref 6–24)
BUN/CREAT SERPL: 15 (ref 9–20)
CALCIUM SERPL-MCNC: 9.2 MG/DL (ref 8.7–10.2)
CHLORIDE SERPL-SCNC: 105 MMOL/L (ref 96–106)
CO2 SERPL-SCNC: 23 MMOL/L (ref 20–29)
CREAT SERPL-MCNC: 2.17 MG/DL (ref 0.76–1.27)
EGFRCR SERPLBLD CKD-EPI 2021: 37 ML/MIN/1.73
ERYTHROCYTE [DISTWIDTH] IN BLOOD BY AUTOMATED COUNT: 13.3 % (ref 11.6–15.4)
GLUCOSE SERPL-MCNC: 125 MG/DL (ref 65–99)
HCT VFR BLD AUTO: 40.6 % (ref 37.5–51)
HGB BLD-MCNC: 13.8 G/DL (ref 13–17.7)
MCH RBC QN AUTO: 29.3 PG (ref 26.6–33)
MCHC RBC AUTO-ENTMCNC: 34 G/DL (ref 31.5–35.7)
MCV RBC AUTO: 86 FL (ref 79–97)
NRBC BLD AUTO-RTO: NORMAL %
PLATELET # BLD AUTO: 295 X10E3/UL (ref 150–450)
POTASSIUM SERPL-SCNC: 4.5 MMOL/L (ref 3.5–5.2)
RBC # BLD AUTO: 4.71 X10E6/UL (ref 4.14–5.8)
SODIUM SERPL-SCNC: 142 MMOL/L (ref 134–144)
WBC # BLD AUTO: 10 X10E3/UL (ref 3.4–10.8)

## 2022-08-17 DIAGNOSIS — E11.65 UNCONTROLLED TYPE 2 DIABETES MELLITUS WITH HYPERGLYCEMIA (HCC): ICD-10-CM

## 2022-08-22 DIAGNOSIS — E11.69 TYPE 2 DIABETES MELLITUS WITH OTHER SPECIFIED COMPLICATION, WITHOUT LONG-TERM CURRENT USE OF INSULIN (HCC): ICD-10-CM

## 2022-09-03 LAB
ALBUMIN/CREAT UR: 113 MG/G CREAT (ref 0–29)
BUN SERPL-MCNC: 27 MG/DL (ref 6–24)
BUN/CREAT SERPL: 15 (ref 9–20)
CALCIUM SERPL-MCNC: 9.5 MG/DL (ref 8.7–10.2)
CHLORIDE SERPL-SCNC: 106 MMOL/L (ref 96–106)
CO2 SERPL-SCNC: 23 MMOL/L (ref 20–29)
CREAT SERPL-MCNC: 1.85 MG/DL (ref 0.76–1.27)
CREAT UR-MCNC: 130.8 MG/DL
EGFRCR-CYS SERPLBLD CKD-EPI 2021: 45 ML/MIN/1.73
ERYTHROCYTE [DISTWIDTH] IN BLOOD BY AUTOMATED COUNT: 12.7 % (ref 11.6–15.4)
GLUCOSE SERPL-MCNC: 129 MG/DL (ref 65–99)
HCT VFR BLD AUTO: 40 % (ref 37.5–51)
HGB BLD-MCNC: 13.3 G/DL (ref 13–17.7)
MCH RBC QN AUTO: 29 PG (ref 26.6–33)
MCHC RBC AUTO-ENTMCNC: 33.3 G/DL (ref 31.5–35.7)
MCV RBC AUTO: 87 FL (ref 79–97)
MICROALBUMIN UR-MCNC: 148.2 UG/ML
NRBC BLD AUTO-RTO: NORMAL %
PLATELET # BLD AUTO: 255 X10E3/UL (ref 150–450)
POTASSIUM SERPL-SCNC: 4.8 MMOL/L (ref 3.5–5.2)
RBC # BLD AUTO: 4.59 X10E6/UL (ref 4.14–5.8)
SODIUM SERPL-SCNC: 144 MMOL/L (ref 134–144)
WBC # BLD AUTO: 8 X10E3/UL (ref 3.4–10.8)

## 2022-09-07 ENCOUNTER — OFFICE VISIT (OUTPATIENT)
Dept: NEPHROLOGY | Facility: MEDICAL CENTER | Age: 47
End: 2022-09-07
Payer: COMMERCIAL

## 2022-09-07 VITALS
WEIGHT: 252 LBS | BODY MASS INDEX: 33.4 KG/M2 | SYSTOLIC BLOOD PRESSURE: 138 MMHG | OXYGEN SATURATION: 97 % | TEMPERATURE: 97.2 F | DIASTOLIC BLOOD PRESSURE: 82 MMHG | HEIGHT: 73 IN | HEART RATE: 82 BPM

## 2022-09-07 DIAGNOSIS — N18.31 TYPE 2 DIABETES MELLITUS WITH STAGE 3A CHRONIC KIDNEY DISEASE, WITHOUT LONG-TERM CURRENT USE OF INSULIN (HCC): ICD-10-CM

## 2022-09-07 DIAGNOSIS — N18.31 STAGE 3A CHRONIC KIDNEY DISEASE: ICD-10-CM

## 2022-09-07 DIAGNOSIS — R80.9 PROTEINURIA, UNSPECIFIED TYPE: ICD-10-CM

## 2022-09-07 DIAGNOSIS — E11.22 TYPE 2 DIABETES MELLITUS WITH STAGE 3A CHRONIC KIDNEY DISEASE, WITHOUT LONG-TERM CURRENT USE OF INSULIN (HCC): ICD-10-CM

## 2022-09-07 DIAGNOSIS — I10 ESSENTIAL HYPERTENSION: ICD-10-CM

## 2022-09-07 PROCEDURE — 99214 OFFICE O/P EST MOD 30 MIN: CPT | Performed by: INTERNAL MEDICINE

## 2022-09-07 RX ORDER — LISINOPRIL 20 MG/1
20 TABLET ORAL DAILY
Qty: 90 TABLET | Refills: 3 | Status: SHIPPED | OUTPATIENT
Start: 2022-09-07 | End: 2023-03-08 | Stop reason: SDUPTHER

## 2022-09-07 RX ORDER — EMPAGLIFLOZIN 10 MG/1
10 TABLET, FILM COATED ORAL DAILY
Qty: 90 TABLET | Refills: 3 | Status: SHIPPED
Start: 2022-09-07 | End: 2022-11-02

## 2022-09-07 ASSESSMENT — ENCOUNTER SYMPTOMS
NAUSEA: 0
COUGH: 0
FEVER: 0
HYPERTENSION: 1
CHILLS: 0
SHORTNESS OF BREATH: 0
VOMITING: 0

## 2022-09-07 ASSESSMENT — FIBROSIS 4 INDEX: FIB4 SCORE: 0.59

## 2022-09-07 NOTE — PROGRESS NOTES
"Subjective     Dung José is a 46 y.o. male who presents with Hypertension and Chronic Kidney Disease            Hypertension  This is a chronic problem. The current episode started more than 1 year ago. The problem is unchanged. The problem is uncontrolled. Pertinent negatives include no chest pain, malaise/fatigue, peripheral edema or shortness of breath. Risk factors for coronary artery disease include diabetes mellitus, male gender and obesity. Past treatments include ACE inhibitors and calcium channel blockers. The current treatment provides moderate improvement. There are no compliance problems.  Hypertensive end-organ damage includes kidney disease. Identifiable causes of hypertension include chronic renal disease.   Chronic Kidney Disease  This is a chronic problem. The current episode started more than 1 year ago. The problem occurs constantly. The problem has been unchanged. Pertinent negatives include no chest pain, chills, coughing, fever, nausea, urinary symptoms or vomiting.     Review of Systems   Constitutional:  Negative for chills, fever and malaise/fatigue.   Respiratory:  Negative for cough and shortness of breath.    Cardiovascular:  Negative for chest pain and leg swelling.   Gastrointestinal:  Negative for nausea and vomiting.   Genitourinary:  Negative for dysuria, frequency and urgency.            Objective     /82 (BP Location: Right arm, Patient Position: Sitting, BP Cuff Size: Large adult)   Pulse 82   Temp 36.2 °C (97.2 °F) (Temporal)   Ht 1.854 m (6' 1\")   Wt 114 kg (252 lb)   SpO2 97%   BMI 33.25 kg/m²      Physical Exam  Vitals and nursing note reviewed.   Constitutional:       General: He is not in acute distress.     Appearance: He is not ill-appearing.   HENT:      Head: Normocephalic and atraumatic.      Right Ear: External ear normal.      Left Ear: External ear normal.      Nose: Nose normal.   Eyes:      General:         Right eye: No discharge.         Left " eye: No discharge.      Conjunctiva/sclera: Conjunctivae normal.   Cardiovascular:      Rate and Rhythm: Normal rate and regular rhythm.      Heart sounds: No murmur heard.  Pulmonary:      Effort: Pulmonary effort is normal. No respiratory distress.      Breath sounds: Normal breath sounds. No wheezing.   Musculoskeletal:         General: No tenderness or deformity.      Right lower leg: No edema.      Left lower leg: No edema.   Skin:     General: Skin is warm.   Neurological:      General: No focal deficit present.      Mental Status: He is alert and oriented to person, place, and time.   Psychiatric:         Mood and Affect: Mood normal.         Behavior: Behavior normal.     Past Medical History:   Diagnosis Date    Diabetes (HCC)     Hypertension      Social History     Socioeconomic History    Marital status: Single     Spouse name: Not on file    Number of children: Not on file    Years of education: Not on file    Highest education level: Not on file   Occupational History    Not on file   Tobacco Use    Smoking status: Former    Smokeless tobacco: Former    Tobacco comments:     no smoking in 20 years   Vaping Use    Vaping Use: Never used   Substance and Sexual Activity    Alcohol use: Not Currently    Drug use: Never    Sexual activity: Not on file   Other Topics Concern    Not on file   Social History Narrative    Not on file     Social Determinants of Health     Financial Resource Strain: Not on file   Food Insecurity: Not on file   Transportation Needs: Not on file   Physical Activity: Not on file   Stress: Not on file   Social Connections: Not on file   Intimate Partner Violence: Not on file   Housing Stability: Not on file     History reviewed. No pertinent family history.  Recent Labs     11/18/21  0516 12/01/21  0606 06/02/22  0428 09/02/22  0559   ALBUMIN 4.6 4.5  --   --    SODIUM 142 135 142 144   POTASSIUM 4.3 4.3 4.5 4.8   CHLORIDE 102 96 105 106   CO2 26 24 23 23   BUN 23 27* 33* 27*    CREATININE 1.57* 1.51* 2.17* 1.85*                           Assessment & Plan        1. Essential hypertension  Uncontrolled  Continue low-sodium diet  Increase lisinopril  2. Stage 3a chronic kidney disease (HCC)  Creatinine is a little bit better  No uremic symptoms  Renal dose of medication  Avoid nephrotoxins  Continue same medication regimen  Start Jardiance, patient was advised about the possible side effects    3. Type 2 diabetes mellitus with stage 3a chronic kidney disease, without long-term current use of insulin (HCC)  Continue to optimize diabetes control  Start Jardiance    4. Proteinuria, unspecified type  Increase lisinopril

## 2022-09-24 DIAGNOSIS — E11.65 UNCONTROLLED TYPE 2 DIABETES MELLITUS WITH HYPERGLYCEMIA (HCC): ICD-10-CM

## 2022-11-02 ENCOUNTER — OFFICE VISIT (OUTPATIENT)
Dept: NEUROLOGY | Facility: MEDICAL CENTER | Age: 47
End: 2022-11-02
Attending: PSYCHIATRY & NEUROLOGY
Payer: COMMERCIAL

## 2022-11-02 VITALS — BODY MASS INDEX: 35.05 KG/M2 | HEART RATE: 97 BPM | WEIGHT: 265.65 LBS | OXYGEN SATURATION: 96 % | TEMPERATURE: 97.1 F

## 2022-11-02 DIAGNOSIS — R56.9 SEIZURE-LIKE ACTIVITY (HCC): Primary | ICD-10-CM

## 2022-11-02 PROCEDURE — 99212 OFFICE O/P EST SF 10 MIN: CPT | Performed by: PSYCHIATRY & NEUROLOGY

## 2022-11-02 PROCEDURE — 99215 OFFICE O/P EST HI 40 MIN: CPT | Performed by: PSYCHIATRY & NEUROLOGY

## 2022-11-02 RX ORDER — LEVETIRACETAM 1000 MG/1
1000 TABLET ORAL 2 TIMES DAILY
Qty: 180 TABLET | Refills: 3 | Status: SHIPPED | OUTPATIENT
Start: 2022-11-02 | End: 2023-03-08 | Stop reason: SDUPTHER

## 2022-11-02 ASSESSMENT — ENCOUNTER SYMPTOMS
TINGLING: 0
SEIZURES: 0
LOSS OF CONSCIOUSNESS: 0
INSOMNIA: 0
SENSORY CHANGE: 0
FOCAL WEAKNESS: 0
MEMORY LOSS: 0

## 2022-11-02 ASSESSMENT — FIBROSIS 4 INDEX: FIB4 SCORE: 0.6

## 2022-11-02 NOTE — PROGRESS NOTES
Subjective     Dung José is a 47 y.o. male who presents for follow-up, with a history of seizures, now for DMV form completion.    HPI    A former patient of BETINA Alcantara, Dung had 2 seizures occur in July, 2020, at the time of rather significant metabolic duress.  Hemoglobin A1c was 14.3, blood pressures were elevated, he was admitted for seizure activity witnessed at home.  He remembers being at home, texting his girlfriend, and then falling backwards on the bed.  He remembers awakening 2 days later intubated in the ICU.    Review of the electronic health record at the time includes MRI scan of the brain which revealed chronic atherosclerotic changes only, no evidence of migrational abnormality, cortical dysgenesis or MTS.  EEG revealed bifrontal intermittent rhythmic delta activity consistent with underlying metabolic abnormalities but also suggestive of epileptic susceptibility.  He was placed on Keppra 1000 mg twice daily.    Since discharge from the hospital, he has had no further seizure events.  The complex partial seizure inventory has been negative.  He has tolerated the Keppra without issue, he does take it consistently.  Earlier this year he had a repeat 24-hour ambulatory EEG which was normal.  Discussions were held regarding discontinuing medication, activity restrictions during the discontinuation, but he has now taken on a second job as a , thus discontinuing the drug has become moot.    His diabetes has come under very good control, his last hemoglobin A1c from December, 2021, was 5.6.  The only Keppra level was from November, 2021, at 20.1.  His stage I kidney disease has fluctuated, his last creatinine 1.85 and BUN 27, urine microalbumin at 113, all from September 2, 2022.  He has no ophthalmic or neuropathic complications from his diabetes.    Medical history is negative for a prior history of seizure, migraine, CVA, CAD, PVD, malignancy, thyroid disease, autoimmune disease,  pulmonary disease, MS or neurodegenerative disease.  There is no surgical history of note from my standpoint.  No one in his family has a history of seizures, stroke or malignancy.  He quit tobacco use and since his seizure, he is no longer drinking.  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    He is on Keppra 1000 mg, twice daily, Glucophage 500 mg twice daily, Norvasc 10 mg daily, Lipitor 40 mg every evening, hydrochlorothiazide 25 mg daily, Prinivil 20 mg daily, and vitamin D 5000 units daily.    Review of Systems   Constitutional:  Negative for malaise/fatigue.   Neurological:  Negative for tingling, sensory change, focal weakness, seizures and loss of consciousness.   Psychiatric/Behavioral:  Negative for memory loss. The patient does not have insomnia.    All other systems reviewed and are negative.    Objective     Pulse 97   Temp 36.2 °C (97.1 °F) (Temporal)   Wt 121 kg (265 lb 10.5 oz)   SpO2 96%   BMI 35.05 kg/m²      Physical Exam    He appears in no acute distress.  Vital signs are stable.  There is no malar rash, jaw or temporal tenderness, jaw claudication, or allodynia.His neck is supple, range of motion is full.  Cardiac evaluation reveals a regular rhythm.  There is no evidence of joint swelling or tenderness, rash, or bruising.       Neurological Exam    Fully oriented, there is no aphasia, agnosia, apraxia, or inattention.    PERRLA/EOMI, visual fields are full, facial movements are symmetric, sensory exam is intact to temperature and light touch.  The tongue and uvula are midline, there is no bulbar dysfunction.  Jaw movements are intact, shoulder shrug and head rotation are normal.    Musculoskeletal exam reveals normal tone bilaterally, there is no tremor, asterixis, or drift.  Strength is 5/5 throughout.  Reflexes are present at all points without asymmetries, none are dropped, both toes are downgoing.    He stands easily, gait is normal and station and stride  length.  There is no appendicular dystaxia.  Fine motor control is intact in all 4 extremities.    Sensory exam is intact to vibration and temperature.    Assessment & Plan     1. Seizure-like activity (HCC)  He is doing well clinically, I suspect he will always do well, and I think he is a very good candidate for getting off antiepileptic medication treatment eventually.  The problem is the activity restrictions that we are forced to do with.  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  We need to maintain a 3-month interval of time where he is no longer required to drive and can avoid dangerous activities.   At that point, we will slowly reduce Keppra by 500 mg increments every week until he is off, though the 3-month clock begins to take as soon as we start his titration.  For now he will drive safely, his DMV form was completed.    I suspect his initial events were a product of metabolic abnormalities combined with a possible underlying structural pathology that cannot be seen yet increases risk.  Fortunately his EEG normalized, and with an unremarkable MRI scan, his prognosis is still quite good.  He is tolerating the Keppra without issue, renal compromise from his diabetes has not been further worsened, so we can keep him on his present dosing.  There is no reason to check a drug level at this time.  He is already on vitamin D supplements.    Face-to-face time was spent reviewing all of the above.  He knows what signs and symptoms might exist that would indicate a seizure recurrence.  No circumstances that lower threshold for seizure. He and I can simply follow-up on an annual basis, he knows to call the office for seizure recurrences, but also to avoid going to the emergency room if at all possible.      - levetiracetam (KEPPRA) 1000 MG tablet; Take 1 Tablet by mouth 2 times a day.  Dispense: 180 Tablet; Refill: 3    Time: 40 minutes in total spent on patient care including precharting, record  review, discussion with healthcare staff and documentation.  This includes face-to-face time for exam, review, discussion, as well as counseling and coordinating care.

## 2022-11-24 ENCOUNTER — PATIENT MESSAGE (OUTPATIENT)
Dept: MEDICAL GROUP | Facility: CLINIC | Age: 47
End: 2022-11-24
Payer: COMMERCIAL

## 2022-11-24 RX ORDER — ATORVASTATIN CALCIUM 40 MG/1
TABLET, FILM COATED ORAL
Qty: 90 TABLET | Refills: 0 | Status: SHIPPED | OUTPATIENT
Start: 2022-11-24 | End: 2023-03-02

## 2022-11-24 RX ORDER — AMLODIPINE BESYLATE 10 MG/1
TABLET ORAL
Qty: 90 TABLET | Refills: 0 | Status: SHIPPED | OUTPATIENT
Start: 2022-11-24 | End: 2023-03-02

## 2022-11-24 RX ORDER — HYDROCHLOROTHIAZIDE 25 MG/1
TABLET ORAL
Qty: 90 TABLET | Refills: 0 | Status: SHIPPED | OUTPATIENT
Start: 2022-11-24 | End: 2023-03-02

## 2023-01-25 ENCOUNTER — APPOINTMENT (OUTPATIENT)
Dept: NEPHROLOGY | Facility: MEDICAL CENTER | Age: 48
End: 2023-01-25
Payer: COMMERCIAL

## 2023-01-31 ENCOUNTER — OFFICE VISIT (OUTPATIENT)
Dept: NEUROLOGY | Facility: MEDICAL CENTER | Age: 48
End: 2023-01-31
Attending: PSYCHIATRY & NEUROLOGY

## 2023-01-31 VITALS
DIASTOLIC BLOOD PRESSURE: 98 MMHG | WEIGHT: 276.46 LBS | SYSTOLIC BLOOD PRESSURE: 138 MMHG | OXYGEN SATURATION: 95 % | HEART RATE: 117 BPM | HEIGHT: 73 IN | BODY MASS INDEX: 36.64 KG/M2 | TEMPERATURE: 98.7 F

## 2023-01-31 DIAGNOSIS — R56.9 SEIZURE-LIKE ACTIVITY (HCC): Primary | ICD-10-CM

## 2023-01-31 PROCEDURE — 99213 OFFICE O/P EST LOW 20 MIN: CPT | Performed by: PSYCHIATRY & NEUROLOGY

## 2023-01-31 PROCEDURE — 99212 OFFICE O/P EST SF 10 MIN: CPT | Performed by: PSYCHIATRY & NEUROLOGY

## 2023-01-31 ASSESSMENT — ENCOUNTER SYMPTOMS
MEMORY LOSS: 0
FALLS: 0
SENSORY CHANGE: 0

## 2023-01-31 ASSESSMENT — PATIENT HEALTH QUESTIONNAIRE - PHQ9: CLINICAL INTERPRETATION OF PHQ2 SCORE: 0

## 2023-01-31 ASSESSMENT — FIBROSIS 4 INDEX: FIB4 SCORE: 0.6

## 2023-01-31 NOTE — PROGRESS NOTES
"Subjective     Dung José is a 47 y.o. male who presents for urgent follow-up, seen just a little over a month ago with a history of seizures, but whose DMV form needs to be completed.     JORGE Razo is done well, his last seizure occurred on July 19, 2020.  On Keppra 1000 mg, twice daily, he is doing well.  He tolerates the drug without issue.  He drives safely.  He has had no other physical or cognitive constraints.    Medical, surgical and family histories are reviewed, there are no new drug allergies.  He is on Keppra 1000 mg, twice daily, Glucophage, Norvasc, Lipitor, HCTZ, Prinivil and vitamin D.    Review of Systems   Musculoskeletal:  Negative for falls.   Neurological:  Negative for sensory change.   Psychiatric/Behavioral:  Negative for memory loss.    All other systems reviewed and are negative.    Objective     BP (!) 138/98 (BP Location: Right arm, Patient Position: Sitting, BP Cuff Size: Adult)   Pulse (!) 117   Temp 37.1 °C (98.7 °F) (Temporal)   Ht 1.854 m (6' 1\")   Wt (!) 125 kg (276 lb 7.3 oz)   SpO2 95%   BMI 36.47 kg/m²      Physical Exam    He appears in no acute distress.  His vital signs are stable, blood pressure is slightly elevated, at 138/98, pulse also elevated 117.  He figures it is his exacerbation with the DMV!  He is cooperative.  There is no malar rash or jaw claudication.  The neck is supple.  Cardiac evaluation is unremarkable.     Neurological Exam    Including mental status, cranial nerves, musculoskeletal, reflex, coordination, and sensory evaluations, his examination is unchanged, showing signs only of minimal stocking pattern loss of sensation in the distal lower extremities and diminished reflexes.  There is no postural or appendicular dystaxia.  Visual fields are full, cognition is intact.    Assessment & Plan     1. Seizure-like activity (HCC)  No surprise, clinically stable, DMV form paperwork is completed.  He will be taking his back with him.  He and I can " follow-up later this year in anticipation of another DMV request for paperwork.  He knows to call the office for any seizure breakthroughs, he knows to stay compliant with Keppra.    Time: 20 minutes in total spent on patient care including precharting, record review, discussion with healthcare staff and documentation.  This includes face-to-face time for exam, review, discussion, as well as counseling and coordinating care.

## 2023-03-02 ENCOUNTER — APPOINTMENT (OUTPATIENT)
Dept: LAB | Facility: MEDICAL CENTER | Age: 48
End: 2023-03-02
Attending: INTERNAL MEDICINE
Payer: COMMERCIAL

## 2023-03-02 RX ORDER — AMLODIPINE BESYLATE 10 MG/1
TABLET ORAL
Qty: 90 TABLET | Refills: 0 | Status: SHIPPED | OUTPATIENT
Start: 2023-03-02

## 2023-03-02 RX ORDER — HYDROCHLOROTHIAZIDE 25 MG/1
TABLET ORAL
Qty: 90 TABLET | Refills: 0 | Status: SHIPPED | OUTPATIENT
Start: 2023-03-02 | End: 2023-06-05

## 2023-03-02 RX ORDER — ATORVASTATIN CALCIUM 40 MG/1
TABLET, FILM COATED ORAL
Qty: 90 TABLET | Refills: 0 | Status: SHIPPED | OUTPATIENT
Start: 2023-03-02 | End: 2023-05-23

## 2023-03-07 ENCOUNTER — APPOINTMENT (OUTPATIENT)
Dept: NEPHROLOGY | Facility: MEDICAL CENTER | Age: 48
End: 2023-03-07
Payer: COMMERCIAL

## 2023-03-08 DIAGNOSIS — R56.9 SEIZURE-LIKE ACTIVITY (HCC): ICD-10-CM

## 2023-03-08 DIAGNOSIS — E11.65 UNCONTROLLED TYPE 2 DIABETES MELLITUS WITH HYPERGLYCEMIA (HCC): ICD-10-CM

## 2023-03-13 RX ORDER — LISINOPRIL 20 MG/1
20 TABLET ORAL DAILY
Qty: 90 TABLET | Refills: 3 | Status: SHIPPED | OUTPATIENT
Start: 2023-03-13

## 2023-03-13 RX ORDER — LEVETIRACETAM 1000 MG/1
1000 TABLET ORAL 2 TIMES DAILY
Qty: 60 TABLET | Refills: 0 | Status: SHIPPED | OUTPATIENT
Start: 2023-03-13

## 2023-04-06 ENCOUNTER — APPOINTMENT (OUTPATIENT)
Dept: MEDICAL GROUP | Facility: CLINIC | Age: 48
End: 2023-04-06
Payer: COMMERCIAL

## 2023-05-23 RX ORDER — ATORVASTATIN CALCIUM 40 MG/1
TABLET, FILM COATED ORAL
Qty: 90 TABLET | Refills: 0 | Status: SHIPPED | OUTPATIENT
Start: 2023-05-23

## 2023-06-05 RX ORDER — HYDROCHLOROTHIAZIDE 25 MG/1
TABLET ORAL
Qty: 90 TABLET | Refills: 0 | Status: SHIPPED | OUTPATIENT
Start: 2023-06-05

## 2023-08-25 ENCOUNTER — APPOINTMENT (OUTPATIENT)
Dept: LAB | Facility: MEDICAL CENTER | Age: 48
End: 2023-08-25

## 2023-09-27 DIAGNOSIS — E11.65 UNCONTROLLED TYPE 2 DIABETES MELLITUS WITH HYPERGLYCEMIA (HCC): ICD-10-CM

## 2023-09-27 NOTE — TELEPHONE ENCOUNTER
Received request via: Pharmacy    Was the patient seen in the last year in this department? No- last seen 04/2022    Does the patient have an active prescription (recently filled or refills available) for medication(s) requested? No    Does the patient have long term Plus and need 100 day supply (blood pressure, diabetes and cholesterol meds only)? Patient does not have SCP   History/Exam/Medical Decision Making

## 2023-11-08 ENCOUNTER — APPOINTMENT (OUTPATIENT)
Dept: NEUROLOGY | Facility: MEDICAL CENTER | Age: 48
End: 2023-11-08
Attending: PSYCHIATRY & NEUROLOGY

## 2023-12-18 ENCOUNTER — PATIENT MESSAGE (OUTPATIENT)
Dept: MEDICAL GROUP | Facility: CLINIC | Age: 48
End: 2023-12-18

## 2024-02-16 ENCOUNTER — PATIENT MESSAGE (OUTPATIENT)
Dept: HEALTH INFORMATION MANAGEMENT | Facility: OTHER | Age: 49
End: 2024-02-16

## 2025-07-01 NOTE — ED NOTES
Family at bedside, updated on plan of care.  COVID swab sent.    Hx of BPH on finasteride 5mg qd and tamsulosin 0.4mg qd   - c/w home meds